# Patient Record
Sex: MALE | Race: WHITE | NOT HISPANIC OR LATINO
[De-identification: names, ages, dates, MRNs, and addresses within clinical notes are randomized per-mention and may not be internally consistent; named-entity substitution may affect disease eponyms.]

---

## 2017-01-30 ENCOUNTER — APPOINTMENT (OUTPATIENT)
Dept: ORTHOPEDIC SURGERY | Facility: CLINIC | Age: 62
End: 2017-01-30

## 2017-02-02 ENCOUNTER — APPOINTMENT (OUTPATIENT)
Dept: INTERNAL MEDICINE | Facility: CLINIC | Age: 62
End: 2017-02-02

## 2017-02-02 VITALS
HEIGHT: 73 IN | RESPIRATION RATE: 14 BRPM | SYSTOLIC BLOOD PRESSURE: 126 MMHG | DIASTOLIC BLOOD PRESSURE: 76 MMHG | WEIGHT: 165 LBS | BODY MASS INDEX: 21.87 KG/M2 | HEART RATE: 68 BPM

## 2017-02-08 ENCOUNTER — APPOINTMENT (OUTPATIENT)
Dept: INTERNAL MEDICINE | Facility: CLINIC | Age: 62
End: 2017-02-08

## 2017-02-08 VITALS
DIASTOLIC BLOOD PRESSURE: 70 MMHG | SYSTOLIC BLOOD PRESSURE: 124 MMHG | RESPIRATION RATE: 14 BRPM | HEIGHT: 73 IN | HEART RATE: 66 BPM | WEIGHT: 164 LBS | BODY MASS INDEX: 21.74 KG/M2

## 2017-02-08 RX ORDER — PREDNISONE 10 MG/1
10 TABLET ORAL
Qty: 10 | Refills: 0 | Status: DISCONTINUED | COMMUNITY
Start: 2016-12-09

## 2017-02-08 RX ORDER — AMOXICILLIN AND CLAVULANATE POTASSIUM 600; 42.9 MG/5ML; MG/5ML
600-42.9 FOR SUSPENSION ORAL
Qty: 125 | Refills: 0 | Status: DISCONTINUED | COMMUNITY
Start: 2017-01-05

## 2017-02-08 RX ORDER — AZITHROMYCIN 250 MG/1
250 TABLET, FILM COATED ORAL
Qty: 6 | Refills: 0 | Status: DISCONTINUED | COMMUNITY
Start: 2017-01-06

## 2017-02-09 ENCOUNTER — APPOINTMENT (OUTPATIENT)
Dept: ORTHOPEDIC SURGERY | Facility: CLINIC | Age: 62
End: 2017-02-09

## 2017-02-23 ENCOUNTER — APPOINTMENT (OUTPATIENT)
Dept: INTERNAL MEDICINE | Facility: CLINIC | Age: 62
End: 2017-02-23

## 2017-03-31 ENCOUNTER — APPOINTMENT (OUTPATIENT)
Dept: ORTHOPEDIC SURGERY | Facility: CLINIC | Age: 62
End: 2017-03-31

## 2017-03-31 VITALS — BODY MASS INDEX: 21.2 KG/M2 | HEIGHT: 73 IN | WEIGHT: 160 LBS

## 2017-04-14 ENCOUNTER — APPOINTMENT (OUTPATIENT)
Dept: INTERNAL MEDICINE | Facility: CLINIC | Age: 62
End: 2017-04-14

## 2017-04-14 VITALS
DIASTOLIC BLOOD PRESSURE: 74 MMHG | WEIGHT: 161 LBS | HEIGHT: 73 IN | BODY MASS INDEX: 21.34 KG/M2 | HEART RATE: 68 BPM | RESPIRATION RATE: 14 BRPM | SYSTOLIC BLOOD PRESSURE: 126 MMHG

## 2017-07-17 ENCOUNTER — APPOINTMENT (OUTPATIENT)
Dept: INTERNAL MEDICINE | Facility: CLINIC | Age: 62
End: 2017-07-17

## 2017-07-17 VITALS
HEART RATE: 70 BPM | WEIGHT: 159 LBS | HEIGHT: 73 IN | DIASTOLIC BLOOD PRESSURE: 72 MMHG | RESPIRATION RATE: 14 BRPM | SYSTOLIC BLOOD PRESSURE: 128 MMHG | BODY MASS INDEX: 21.07 KG/M2

## 2017-07-17 DIAGNOSIS — Z83.1 FAMILY HISTORY OF OTHER INFECTIOUS AND PARASITIC DISEASES: ICD-10-CM

## 2017-07-17 RX ORDER — HYDROCORTISONE ACETATE 25 MG/1
25 SUPPOSITORY RECTAL TWICE DAILY
Qty: 24 | Refills: 2 | Status: DISCONTINUED | COMMUNITY
Start: 2017-02-02 | End: 2017-07-17

## 2017-07-17 RX ORDER — HYDROCORTISONE 25 MG/G
2.5 CREAM TOPICAL
Qty: 1 | Refills: 2 | Status: DISCONTINUED | COMMUNITY
Start: 2017-02-02 | End: 2017-07-17

## 2017-09-13 ENCOUNTER — APPOINTMENT (OUTPATIENT)
Dept: ORTHOPEDIC SURGERY | Facility: CLINIC | Age: 62
End: 2017-09-13
Payer: COMMERCIAL

## 2017-09-13 DIAGNOSIS — M25.551 PAIN IN RIGHT HIP: ICD-10-CM

## 2017-09-13 PROCEDURE — 99213 OFFICE O/P EST LOW 20 MIN: CPT

## 2017-09-13 PROCEDURE — 73522 X-RAY EXAM HIPS BI 3-4 VIEWS: CPT

## 2017-09-21 ENCOUNTER — APPOINTMENT (OUTPATIENT)
Dept: ORTHOPEDIC SURGERY | Facility: CLINIC | Age: 62
End: 2017-09-21
Payer: COMMERCIAL

## 2017-09-21 PROCEDURE — 99213 OFFICE O/P EST LOW 20 MIN: CPT

## 2017-10-20 ENCOUNTER — APPOINTMENT (OUTPATIENT)
Dept: INTERNAL MEDICINE | Facility: CLINIC | Age: 62
End: 2017-10-20
Payer: COMMERCIAL

## 2017-10-20 VITALS
RESPIRATION RATE: 14 BRPM | BODY MASS INDEX: 20.94 KG/M2 | SYSTOLIC BLOOD PRESSURE: 124 MMHG | HEIGHT: 73 IN | TEMPERATURE: 98.2 F | HEART RATE: 68 BPM | WEIGHT: 158 LBS | DIASTOLIC BLOOD PRESSURE: 78 MMHG

## 2017-10-20 PROCEDURE — 99214 OFFICE O/P EST MOD 30 MIN: CPT

## 2017-12-07 ENCOUNTER — APPOINTMENT (OUTPATIENT)
Dept: INTERNAL MEDICINE | Facility: CLINIC | Age: 62
End: 2017-12-07
Payer: COMMERCIAL

## 2017-12-07 ENCOUNTER — NON-APPOINTMENT (OUTPATIENT)
Age: 62
End: 2017-12-07

## 2017-12-07 VITALS
HEART RATE: 70 BPM | SYSTOLIC BLOOD PRESSURE: 128 MMHG | BODY MASS INDEX: 22.13 KG/M2 | WEIGHT: 167 LBS | DIASTOLIC BLOOD PRESSURE: 80 MMHG | HEIGHT: 73 IN | RESPIRATION RATE: 14 BRPM

## 2017-12-07 DIAGNOSIS — K62.5 HEMORRHAGE OF ANUS AND RECTUM: ICD-10-CM

## 2017-12-07 PROCEDURE — 99396 PREV VISIT EST AGE 40-64: CPT

## 2017-12-07 PROCEDURE — 93000 ELECTROCARDIOGRAM COMPLETE: CPT

## 2017-12-07 PROCEDURE — 99214 OFFICE O/P EST MOD 30 MIN: CPT | Mod: 25

## 2017-12-07 RX ORDER — PANTOPRAZOLE 40 MG/1
40 TABLET, DELAYED RELEASE ORAL
Qty: 60 | Refills: 4 | Status: DISCONTINUED | COMMUNITY
Start: 2017-07-17 | End: 2017-12-07

## 2017-12-29 ENCOUNTER — APPOINTMENT (OUTPATIENT)
Dept: INTERNAL MEDICINE | Facility: CLINIC | Age: 62
End: 2017-12-29
Payer: COMMERCIAL

## 2017-12-29 VITALS
HEART RATE: 68 BPM | BODY MASS INDEX: 22.4 KG/M2 | RESPIRATION RATE: 15 BRPM | WEIGHT: 169 LBS | DIASTOLIC BLOOD PRESSURE: 74 MMHG | TEMPERATURE: 98.4 F | SYSTOLIC BLOOD PRESSURE: 124 MMHG | HEIGHT: 73 IN

## 2017-12-29 DIAGNOSIS — H72.91 UNSPECIFIED PERFORATION OF TYMPANIC MEMBRANE, RIGHT EAR: ICD-10-CM

## 2017-12-29 PROCEDURE — 99215 OFFICE O/P EST HI 40 MIN: CPT

## 2017-12-29 RX ORDER — AMOXICILLIN 250 MG/5ML
250 POWDER, FOR SUSPENSION ORAL
Qty: 200 | Refills: 0 | Status: DISCONTINUED | COMMUNITY
Start: 2017-08-24

## 2017-12-29 RX ORDER — IPRATROPIUM BROMIDE 42 UG/1
0.06 SPRAY NASAL
Qty: 15 | Refills: 0 | Status: DISCONTINUED | COMMUNITY
Start: 2017-08-30

## 2017-12-30 ENCOUNTER — LABORATORY RESULT (OUTPATIENT)
Age: 62
End: 2017-12-30

## 2018-01-03 ENCOUNTER — APPOINTMENT (OUTPATIENT)
Dept: INTERNAL MEDICINE | Facility: CLINIC | Age: 63
End: 2018-01-03
Payer: COMMERCIAL

## 2018-01-03 VITALS
SYSTOLIC BLOOD PRESSURE: 126 MMHG | HEIGHT: 73 IN | RESPIRATION RATE: 16 BRPM | WEIGHT: 168 LBS | BODY MASS INDEX: 22.26 KG/M2 | HEART RATE: 74 BPM | TEMPERATURE: 98.1 F | DIASTOLIC BLOOD PRESSURE: 80 MMHG

## 2018-01-03 DIAGNOSIS — K64.5 PERIANAL VENOUS THROMBOSIS: ICD-10-CM

## 2018-01-03 LAB
25(OH)D3 SERPL-MCNC: 27.5 NG/ML
ALBUMIN SERPL ELPH-MCNC: 4.4 G/DL
ALP BLD-CCNC: 51 U/L
ALT SERPL-CCNC: 19 U/L
ANION GAP SERPL CALC-SCNC: 11 MMOL/L
APPEARANCE: ABNORMAL
AST SERPL-CCNC: 24 U/L
BASOPHILS # BLD AUTO: 0.01 K/UL
BASOPHILS NFR BLD AUTO: 0.3 %
BILIRUB SERPL-MCNC: 0.5 MG/DL
BILIRUBIN URINE: NEGATIVE
BLOOD URINE: NEGATIVE
BUN SERPL-MCNC: 16 MG/DL
CALCIUM SERPL-MCNC: 9.6 MG/DL
CHLORIDE SERPL-SCNC: 100 MMOL/L
CHOLEST SERPL-MCNC: 213 MG/DL
CHOLEST/HDLC SERPL: 2.8 RATIO
CO2 SERPL-SCNC: 28 MMOL/L
COLOR: YELLOW
CREAT SERPL-MCNC: 0.87 MG/DL
CRP SERPL HS-MCNC: 0.2 MG/L
EOSINOPHIL # BLD AUTO: 0.25 K/UL
EOSINOPHIL NFR BLD AUTO: 6.3 %
GLUCOSE BS SERPL-MCNC: 85 MG/DL
GLUCOSE QUALITATIVE U: NEGATIVE MG/DL
GLUCOSE SERPL-MCNC: 90 MG/DL
HBA1C MFR BLD HPLC: 5.2 %
HCT VFR BLD CALC: 45 %
HCV AB SER QL: NONREACTIVE
HCV S/CO RATIO: 0.11 S/CO
HDLC SERPL-MCNC: 75 MG/DL
HGB BLD-MCNC: 14.7 G/DL
IMM GRANULOCYTES NFR BLD AUTO: 0.3 %
KETONES URINE: NEGATIVE
LDLC SERPL CALC-MCNC: 124 MG/DL
LEUKOCYTE ESTERASE URINE: NEGATIVE
LYMPHOCYTES # BLD AUTO: 1.12 K/UL
LYMPHOCYTES NFR BLD AUTO: 28.4 %
MAN DIFF?: NORMAL
MCHC RBC-ENTMCNC: 31.5 PG
MCHC RBC-ENTMCNC: 32.7 GM/DL
MCV RBC AUTO: 96.6 FL
MONOCYTES # BLD AUTO: 0.46 K/UL
MONOCYTES NFR BLD AUTO: 11.7 %
NEUTROPHILS # BLD AUTO: 2.09 K/UL
NEUTROPHILS NFR BLD AUTO: 53 %
NITRITE URINE: NEGATIVE
PH URINE: 7.5
PLATELET # BLD AUTO: 245 K/UL
POTASSIUM SERPL-SCNC: 4.7 MMOL/L
PROT SERPL-MCNC: 6.9 G/DL
PROTEIN URINE: NEGATIVE MG/DL
PSA FREE FLD-MCNC: 48
PSA FREE SERPL-MCNC: 0.47 NG/ML
PSA SERPL-MCNC: 0.98 NG/ML
RBC # BLD: 4.66 M/UL
RBC # FLD: 13.2 %
SODIUM SERPL-SCNC: 139 MMOL/L
SPECIFIC GRAVITY URINE: 1.02
T4 FREE SERPL-MCNC: 1 NG/DL
TESTOST BND SERPL-MCNC: 9.9 PG/ML
TESTOST SERPL-MCNC: 492.7 NG/DL
TRIGL SERPL-MCNC: 71 MG/DL
TSH SERPL-ACNC: 2.28 UIU/ML
UROBILINOGEN URINE: NEGATIVE MG/DL
VIT B12 SERPL-MCNC: 439 PG/ML
WBC # FLD AUTO: 3.94 K/UL

## 2018-01-03 PROCEDURE — 99215 OFFICE O/P EST HI 40 MIN: CPT

## 2018-02-14 ENCOUNTER — APPOINTMENT (OUTPATIENT)
Dept: INTERNAL MEDICINE | Facility: CLINIC | Age: 63
End: 2018-02-14
Payer: COMMERCIAL

## 2018-02-14 VITALS
HEIGHT: 73 IN | WEIGHT: 170 LBS | HEART RATE: 76 BPM | BODY MASS INDEX: 22.53 KG/M2 | DIASTOLIC BLOOD PRESSURE: 80 MMHG | RESPIRATION RATE: 14 BRPM | SYSTOLIC BLOOD PRESSURE: 128 MMHG

## 2018-02-14 DIAGNOSIS — R82.90 UNSPECIFIED ABNORMAL FINDINGS IN URINE: ICD-10-CM

## 2018-02-14 DIAGNOSIS — R63.5 ABNORMAL WEIGHT GAIN: ICD-10-CM

## 2018-02-14 DIAGNOSIS — K64.5 PERIANAL VENOUS THROMBOSIS: ICD-10-CM

## 2018-02-14 PROCEDURE — 99215 OFFICE O/P EST HI 40 MIN: CPT

## 2018-02-15 LAB
APPEARANCE: CLEAR
BILIRUBIN URINE: NEGATIVE
BLOOD URINE: NEGATIVE
COLOR: YELLOW
GLUCOSE QUALITATIVE U: NEGATIVE MG/DL
KETONES URINE: NEGATIVE
LEUKOCYTE ESTERASE URINE: NEGATIVE
NITRITE URINE: NEGATIVE
PH URINE: 6
PROTEIN URINE: NEGATIVE MG/DL
SPECIFIC GRAVITY URINE: 1.01
UROBILINOGEN URINE: NEGATIVE MG/DL

## 2018-05-15 ENCOUNTER — APPOINTMENT (OUTPATIENT)
Dept: ORTHOPEDIC SURGERY | Facility: CLINIC | Age: 63
End: 2018-05-15
Payer: COMMERCIAL

## 2018-05-15 PROCEDURE — 73564 X-RAY EXAM KNEE 4 OR MORE: CPT | Mod: RT

## 2018-05-15 PROCEDURE — 99213 OFFICE O/P EST LOW 20 MIN: CPT

## 2018-07-05 ENCOUNTER — APPOINTMENT (OUTPATIENT)
Dept: INTERNAL MEDICINE | Facility: CLINIC | Age: 63
End: 2018-07-05

## 2018-07-17 ENCOUNTER — APPOINTMENT (OUTPATIENT)
Dept: INTERNAL MEDICINE | Facility: CLINIC | Age: 63
End: 2018-07-17
Payer: COMMERCIAL

## 2018-07-17 VITALS
BODY MASS INDEX: 22.66 KG/M2 | SYSTOLIC BLOOD PRESSURE: 124 MMHG | RESPIRATION RATE: 15 BRPM | HEIGHT: 73 IN | HEART RATE: 80 BPM | WEIGHT: 171 LBS | DIASTOLIC BLOOD PRESSURE: 80 MMHG

## 2018-07-17 PROCEDURE — 99215 OFFICE O/P EST HI 40 MIN: CPT

## 2018-07-17 RX ORDER — PANTOPRAZOLE 40 MG/1
40 TABLET, DELAYED RELEASE ORAL
Qty: 30 | Refills: 5 | Status: DISCONTINUED | COMMUNITY
Start: 2018-03-01 | End: 2018-07-17

## 2018-07-17 NOTE — PHYSICAL EXAM
[No Acute Distress] : no acute distress [Well Nourished] : well nourished [Well Developed] : well developed [Well-Appearing] : well-appearing [Normal Voice/Communication] : normal voice/communication [Normal Sclera/Conjunctiva] : normal sclera/conjunctiva [PERRL] : pupils equal round and reactive to light [EOMI] : extraocular movements intact [Normal Outer Ear/Nose] : the outer ears and nose were normal in appearance [Normal Oropharynx] : the oropharynx was normal [Normal TMs] : both tympanic membranes were normal [Normal Nasal Mucosa] : the nasal mucosa was normal [No JVD] : no jugular venous distention [Supple] : supple [No Lymphadenopathy] : no lymphadenopathy [Thyroid Normal, No Nodules] : the thyroid was normal and there were no nodules present [No Respiratory Distress] : no respiratory distress  [Clear to Auscultation] : lungs were clear to auscultation bilaterally [No Accessory Muscle Use] : no accessory muscle use [Normal Percussion] : the chest was normal to percussion [Normal Rate] : normal rate  [Regular Rhythm] : with a regular rhythm [Normal S1, S2] : normal S1 and S2 [No Murmur] : no murmur heard [No Carotid Bruits] : no carotid bruits [No Abdominal Bruit] : a ~M bruit was not heard ~T in the abdomen [No Varicosities] : no varicosities [Pedal Pulses Present] : the pedal pulses are present [No Edema] : there was no peripheral edema [No Extremity Clubbing/Cyanosis] : no extremity clubbing/cyanosis [No Palpable Aorta] : no palpable aorta [Declined Breast Exam] : declined breast exam  [Soft] : abdomen soft [Non Tender] : non-tender [Non-distended] : non-distended [No Masses] : no abdominal mass palpated [No HSM] : no HSM [Normal Bowel Sounds] : normal bowel sounds [No Hernias] : no hernias [Declined Rectal Exam] : declined rectal exam [Normal Supraclavicular Nodes] : no supraclavicular lymphadenopathy [Normal Axillary Nodes] : no axillary lymphadenopathy [Normal Posterior Cervical Nodes] : no posterior cervical lymphadenopathy [Normal Femoral Nodes] : no femoral lymphadenopathy [Normal Anterior Cervical Nodes] : no anterior cervical lymphadenopathy [Normal Inguinal Nodes] : no inguinal lymphadenopathy [No CVA Tenderness] : no CVA  tenderness [No Spinal Tenderness] : no spinal tenderness [No Joint Swelling] : no joint swelling [Grossly Normal Strength/Tone] : grossly normal strength/tone [No Rash] : no rash [Normal Gait] : normal gait [Coordination Grossly Intact] : coordination grossly intact [No Focal Deficits] : no focal deficits [Deep Tendon Reflexes (DTR)] : deep tendon reflexes were 2+ and symmetric [Speech Grossly Normal] : speech grossly normal [Memory Grossly Normal] : memory grossly normal [Normal Affect] : the affect was normal [Alert and Oriented x3] : oriented to person, place, and time [Normal Mood] : the mood was normal [Normal Insight/Judgement] : insight and judgment were intact

## 2018-07-17 NOTE — ASSESSMENT
[FreeTextEntry1] : Her examination shows a well-developed man in no acute distress his weight was 171 pounds he is 6 foot 1 inch heart rate of 80 respiratory rate of 15 blood pressure 124/80 HEENT was unremarkable chest was clear cardiovascular was regular abdomen was soft neuro was nonfocal examination . Patient's situation with his dysphagia was reviewed he has dysphagia to solid foods only occasionally has no problem with left liquid foods making motility less likely he has had ENT exams showing oropharyngeal reflux a barium swallow suggested slight narrowing at the GE junction was was not confirmed by endoscopy there was no resistance to passage of the scope through the GE junction patient is concerned about associated conditions in the fasciculation and will seek neurologic consultation evaluate for neurologic diseases if his dysphagia worsens at any point he will be referred to a motility Center at [facility] for further evaluation

## 2018-07-17 NOTE — REVIEW OF SYSTEMS
[Nocturia] : nocturia [Impotence] : impotence [Fever] : no fever [Chills] : no chills [Fatigue] : no fatigue [Hot Flashes] : no hot flashes [Night Sweats] : no night sweats [Recent Change In Weight] : ~T no recent weight change [Discharge] : no discharge [Pain] : no pain [Redness] : no redness [Dryness] : no dryness [Vision Problems] : no vision problems [Itching] : no itching [Earache] : no earache [Hearing Loss] : no hearing loss [Nosebleeds] : no nosebleeds [Postnasal Drip] : no postnasal drip [Nasal Discharge] : no nasal discharge [Sore Throat] : no sore throat [Hoarseness] : no hoarseness [Chest Pain] : no chest pain [Palpitations] : no palpitations [Claudication] : no  leg claudication [Lower Ext Edema] : no lower extremity edema [Orthopena] : no orthopnea [Paroysmal Nocturnal Dyspnea] : no paroysmal nocturnal dyspnea [Shortness Of Breath] : no shortness of breath [Wheezing] : no wheezing [Cough] : no cough [Dyspnea on Exertion] : not dyspnea on exertion [Abdominal Pain] : no abdominal pain [Nausea] : no nausea [Constipation] : no constipation [Diarrhea] : no diarrhea [Vomiting] : no vomiting [Heartburn] : no heartburn [Melena] : no melena [Dysuria] : no dysuria [Incontinence] : no incontinence [Hesitancy] : no hesitancy [Hematuria] : no hematuria [Frequency] : no frequency [Poor Libido] : libido not poor [Joint Pain] : no joint pain [Joint Stiffness] : no joint stiffness [Muscle Pain] : no muscle pain [Muscle Weakness] : no muscle weakness [Back Pain] : no back pain [Joint Swelling] : no joint swelling [Mole Changes] : no mole changes [Nail Changes] : no nail changes [Hair Changes] : no hair changes [Skin Rash] : no skin rash [Headache] : no headache [Dizziness] : no dizziness [Fainting] : no fainting [Confusion] : no confusion [Unsteady Walk] : no ataxia [Memory Loss] : no memory loss [Suicidal] : not suicidal [Insomnia] : no insomnia [Anxiety] : no anxiety [Depression] : no depression [Easy Bleeding] : no easy bleeding [Easy Bruising] : no easy bruising [Swollen Glands] : no swollen glands [FreeTextEntry7] : dysphagia

## 2018-07-22 PROBLEM — M25.551 PAIN IN JOINT INVOLVING PELVIC REGION AND THIGH, RIGHT: Status: ACTIVE | Noted: 2017-09-13

## 2018-08-02 ENCOUNTER — APPOINTMENT (OUTPATIENT)
Dept: INTERNAL MEDICINE | Facility: CLINIC | Age: 63
End: 2018-08-02
Payer: COMMERCIAL

## 2018-08-02 VITALS
RESPIRATION RATE: 15 BRPM | SYSTOLIC BLOOD PRESSURE: 122 MMHG | WEIGHT: 170 LBS | DIASTOLIC BLOOD PRESSURE: 74 MMHG | HEIGHT: 73 IN | HEART RATE: 78 BPM | BODY MASS INDEX: 22.53 KG/M2

## 2018-08-02 PROCEDURE — 99214 OFFICE O/P EST MOD 30 MIN: CPT

## 2018-08-02 NOTE — HEALTH RISK ASSESSMENT
[No falls in past year] : Patient reported no falls in the past year [0] : 2) Feeling down, depressed, or hopeless: Not at all (0) [[id number]] : 0

## 2018-08-02 NOTE — ASSESSMENT
[FreeTextEntry1] : His exam shows a well-developed male in no acute distress vital signs were stable HEENT was unremarkable there was no masses felt there was some muscle tightness on the right side of his neck patient has limited flexion and extension and lateral rotation secondary to previous problems with his lower cervical spine chest is clear cardiovascular was regular abdomen soft neuro nonfocal impression cervical neck pain we'll treat with heat anti-inflammatories x-ray will be obtained if no improvement by Monday he has a scheduled appointment to see a neurologist next week where the situation can be reevaluated

## 2018-08-02 NOTE — REVIEW OF SYSTEMS
[Back Pain] : back pain [Fever] : no fever [Chills] : no chills [Fatigue] : no fatigue [Hot Flashes] : no hot flashes [Night Sweats] : no night sweats [Recent Change In Weight] : ~T no recent weight change [Discharge] : no discharge [Pain] : no pain [Redness] : no redness [Dryness] : no dryness [Vision Problems] : no vision problems [Itching] : no itching [Earache] : no earache [Hearing Loss] : no hearing loss [Nosebleeds] : no nosebleeds [Postnasal Drip] : no postnasal drip [Nasal Discharge] : no nasal discharge [Sore Throat] : no sore throat [Hoarseness] : no hoarseness [Chest Pain] : no chest pain [Palpitations] : no palpitations [Claudication] : no  leg claudication [Lower Ext Edema] : no lower extremity edema [Orthopena] : no orthopnea [Paroysmal Nocturnal Dyspnea] : no paroysmal nocturnal dyspnea [Shortness Of Breath] : no shortness of breath [Wheezing] : no wheezing [Cough] : no cough [Dyspnea on Exertion] : not dyspnea on exertion [Abdominal Pain] : no abdominal pain [Nausea] : no nausea [Constipation] : no constipation [Diarrhea] : no diarrhea [Vomiting] : no vomiting [Heartburn] : no heartburn [Melena] : no melena [Dysuria] : no dysuria [Incontinence] : no incontinence [Hesitancy] : no hesitancy [Nocturia] : no nocturia [Hematuria] : no hematuria [Frequency] : no frequency [Impotence] : no impotency [Poor Libido] : libido not poor [Joint Pain] : no joint pain [Joint Stiffness] : no joint stiffness [Muscle Pain] : no muscle pain [Muscle Weakness] : no muscle weakness [Joint Swelling] : no joint swelling [Mole Changes] : no mole changes [Nail Changes] : no nail changes [Hair Changes] : no hair changes [Skin Rash] : no skin rash [Headache] : no headache [Dizziness] : no dizziness [Fainting] : no fainting [Confusion] : no confusion [Unsteady Walk] : no ataxia [Memory Loss] : no memory loss [Suicidal] : not suicidal [Insomnia] : no insomnia [Anxiety] : no anxiety [Depression] : no depression [Easy Bleeding] : no easy bleeding [Easy Bruising] : no easy bruising [Swollen Glands] : no swollen glands [FreeTextEntry9] : right side of neck

## 2018-08-02 NOTE — HISTORY OF PRESENT ILLNESS
[___ Weeks ago] : [unfilled] weeks ago [Mild] : mild [Rest] : rest [Activity] : with activity [Stable] : stable [de-identified] : right neck [FreeTextEntry8] : 63-year-old man with a history of chronic headaches dysphasia GERD constipation comes to the office complaining of a discomfort in the upper right side of his neck over the past several weeks pain is exacerbated slightly by motion describes the pain as 2/10 at times he says the pain is not there he has had previous lower cervical problems and comes in for advice he denies temperature chills sweats or myalgias he has had no chest pain shortness of breath occasional dyspnea palpitations or syncope he does complain of occasional vertigo denies abdominal pain nausea vomiting diarrhea constipation rectal bleeding or melena

## 2018-08-06 ENCOUNTER — APPOINTMENT (OUTPATIENT)
Dept: NEUROLOGY | Facility: CLINIC | Age: 63
End: 2018-08-06
Payer: COMMERCIAL

## 2018-08-06 VITALS
HEART RATE: 83 BPM | SYSTOLIC BLOOD PRESSURE: 118 MMHG | DIASTOLIC BLOOD PRESSURE: 76 MMHG | TEMPERATURE: 98.7 F | RESPIRATION RATE: 18 BRPM | OXYGEN SATURATION: 98 % | BODY MASS INDEX: 23.46 KG/M2 | WEIGHT: 177 LBS | HEIGHT: 73 IN

## 2018-08-06 PROCEDURE — 99204 OFFICE O/P NEW MOD 45 MIN: CPT

## 2018-08-06 RX ORDER — HYDROCORTISONE 25 MG/G
2.5 CREAM TOPICAL
Qty: 1 | Refills: 2 | Status: DISCONTINUED | COMMUNITY
Start: 2017-12-07 | End: 2018-08-06

## 2018-08-15 ENCOUNTER — APPOINTMENT (OUTPATIENT)
Dept: ORTHOPEDIC SURGERY | Facility: CLINIC | Age: 63
End: 2018-08-15
Payer: COMMERCIAL

## 2018-08-15 VITALS
HEIGHT: 73 IN | HEART RATE: 67 BPM | BODY MASS INDEX: 23.46 KG/M2 | DIASTOLIC BLOOD PRESSURE: 66 MMHG | WEIGHT: 177 LBS | SYSTOLIC BLOOD PRESSURE: 109 MMHG

## 2018-08-15 DIAGNOSIS — M54.5 LOW BACK PAIN: ICD-10-CM

## 2018-08-15 PROCEDURE — 99214 OFFICE O/P EST MOD 30 MIN: CPT

## 2018-08-22 ENCOUNTER — APPOINTMENT (OUTPATIENT)
Dept: INTERNAL MEDICINE | Facility: CLINIC | Age: 63
End: 2018-08-22
Payer: COMMERCIAL

## 2018-08-22 VITALS
WEIGHT: 178 LBS | SYSTOLIC BLOOD PRESSURE: 114 MMHG | DIASTOLIC BLOOD PRESSURE: 74 MMHG | BODY MASS INDEX: 23.59 KG/M2 | HEIGHT: 73 IN | RESPIRATION RATE: 15 BRPM | HEART RATE: 72 BPM

## 2018-08-22 DIAGNOSIS — R25.3 FASCICULATION: ICD-10-CM

## 2018-08-22 DIAGNOSIS — M54.2 CERVICALGIA: ICD-10-CM

## 2018-08-22 PROCEDURE — 99214 OFFICE O/P EST MOD 30 MIN: CPT

## 2018-08-22 NOTE — ASSESSMENT
[FreeTextEntry1] : Physical exam shows a well-developed man in no acute distress his blood pressure was 114/74 his height was 6 feet 1 inch weight 178 pounds heart rate is 72 respiratory rate of 14 HEENT was unremarkable chest was clear cardiovascular was regular abdomen soft and nontender 70 showed no clubbing cyanosis or edema neurologic exam was nonfocal;..... patient will undergo EMG and nerve conduction on Tuesday although the chances of finding serious neuromuscular conditions seem low patient was reassured and will get the study comvinced

## 2018-08-22 NOTE — PHYSICAL EXAM
[No Acute Distress] : no acute distress [Well Nourished] : well nourished [Well Developed] : well developed [Well-Appearing] : well-appearing [Normal Voice/Communication] : normal voice/communication [Normal Sclera/Conjunctiva] : normal sclera/conjunctiva [PERRL] : pupils equal round and reactive to light [EOMI] : extraocular movements intact [Normal Outer Ear/Nose] : the outer ears and nose were normal in appearance [Normal Oropharynx] : the oropharynx was normal [Normal TMs] : both tympanic membranes were normal [Normal Nasal Mucosa] : the nasal mucosa was normal [No JVD] : no jugular venous distention [Supple] : supple [No Lymphadenopathy] : no lymphadenopathy [Thyroid Normal, No Nodules] : the thyroid was normal and there were no nodules present [No Respiratory Distress] : no respiratory distress  [Clear to Auscultation] : lungs were clear to auscultation bilaterally [No Accessory Muscle Use] : no accessory muscle use [Normal Rate] : normal rate  [Regular Rhythm] : with a regular rhythm [Normal S1, S2] : normal S1 and S2 [No Murmur] : no murmur heard [No Carotid Bruits] : no carotid bruits [No Abdominal Bruit] : a ~M bruit was not heard ~T in the abdomen [No Varicosities] : no varicosities [Pedal Pulses Present] : the pedal pulses are present [No Edema] : there was no peripheral edema [No Extremity Clubbing/Cyanosis] : no extremity clubbing/cyanosis [No Palpable Aorta] : no palpable aorta [Declined Breast Exam] : declined breast exam  [Soft] : abdomen soft [Non Tender] : non-tender [Non-distended] : non-distended [No Masses] : no abdominal mass palpated [No HSM] : no HSM [Normal Bowel Sounds] : normal bowel sounds [No Hernias] : no hernias [Declined Rectal Exam] : declined rectal exam [Normal Supraclavicular Nodes] : no supraclavicular lymphadenopathy [Normal Axillary Nodes] : no axillary lymphadenopathy [Normal Posterior Cervical Nodes] : no posterior cervical lymphadenopathy [Normal Femoral Nodes] : no femoral lymphadenopathy [Normal Anterior Cervical Nodes] : no anterior cervical lymphadenopathy [Normal Inguinal Nodes] : no inguinal lymphadenopathy [No CVA Tenderness] : no CVA  tenderness [No Spinal Tenderness] : no spinal tenderness [Kyphosis] : no kyphosis [Scoliosis] : no scoliosis [No Joint Swelling] : no joint swelling [Grossly Normal Strength/Tone] : grossly normal strength/tone [No Rash] : no rash [No Skin Lesions] : no skin lesions [Acne] : no acne [Normal Gait] : normal gait [Coordination Grossly Intact] : coordination grossly intact [No Focal Deficits] : no focal deficits [Deep Tendon Reflexes (DTR)] : deep tendon reflexes were 2+ and symmetric [Speech Grossly Normal] : speech grossly normal [Normal Affect] : the affect was normal [Alert and Oriented x3] : oriented to person, place, and time [Normal Mood] : the mood was normal [Normal Insight/Judgement] : insight and judgment were intact

## 2018-08-22 NOTE — HISTORY OF PRESENT ILLNESS
[FreeTextEntry1] : Comes to the office for followup review his medications discuss his overall health recent issues with lightheadedness right-sided neck pain muscle twitching making him concern for a serious neurologic problem [de-identified] : 63-year-old man with a history of cervicogenic headaches this sensation occurred neck pain muscle twitching lightheadedness comes to the office for followup patient has seen 2 neurologists who both have reassured him that they did not believe he any serious neuromuscular conditions since there consultation he claims before bedtime his muscles twitch he denies headache nausea vomiting chest pain shortness of breath exertional dyspnea lightheadedness vertigo or syncope denies abdominal pain swallowing has been doing well with minimal dysphasia appetite has been good his weight is stable his bowels been moving well he does get up once or twice a night to urinate

## 2018-08-22 NOTE — HEALTH RISK ASSESSMENT
[No falls in past year] : Patient reported no falls in the past year [0] : 2) Feeling down, depressed, or hopeless: Not at all (0) [[id number]] : 0

## 2018-08-22 NOTE — REVIEW OF SYSTEMS
[Back Pain] : back pain [Dizziness] : dizziness [Fever] : no fever [Chills] : no chills [Fatigue] : no fatigue [Hot Flashes] : no hot flashes [Night Sweats] : no night sweats [Recent Change In Weight] : ~T no recent weight change [Discharge] : no discharge [Pain] : no pain [Redness] : no redness [Dryness] : no dryness [Vision Problems] : no vision problems [Itching] : no itching [Earache] : no earache [Hearing Loss] : no hearing loss [Nosebleeds] : no nosebleeds [Postnasal Drip] : no postnasal drip [Nasal Discharge] : no nasal discharge [Sore Throat] : no sore throat [Hoarseness] : no hoarseness [Chest Pain] : no chest pain [Palpitations] : no palpitations [Claudication] : no  leg claudication [Lower Ext Edema] : no lower extremity edema [Orthopena] : no orthopnea [Paroysmal Nocturnal Dyspnea] : no paroysmal nocturnal dyspnea [Shortness Of Breath] : no shortness of breath [Wheezing] : no wheezing [Cough] : no cough [Dyspnea on Exertion] : not dyspnea on exertion [Abdominal Pain] : no abdominal pain [Nausea] : no nausea [Constipation] : no constipation [Diarrhea] : no diarrhea [Vomiting] : no vomiting [Heartburn] : no heartburn [Melena] : no melena [Dysuria] : no dysuria [Incontinence] : no incontinence [Hesitancy] : no hesitancy [Nocturia] : no nocturia [Hematuria] : no hematuria [Frequency] : no frequency [Impotence] : no impotency [Poor Libido] : libido not poor [Joint Pain] : no joint pain [Joint Stiffness] : no joint stiffness [Muscle Pain] : no muscle pain [Muscle Weakness] : no muscle weakness [Joint Swelling] : no joint swelling [Itching] : no itching [Mole Changes] : no mole changes [Nail Changes] : no nail changes [Hair Changes] : no hair changes [Skin Rash] : no skin rash [Headache] : no headache [Fainting] : no fainting [Confusion] : no confusion [Unsteady Walk] : no ataxia [Memory Loss] : no memory loss [Suicidal] : not suicidal [Insomnia] : no insomnia [Anxiety] : no anxiety [Depression] : no depression [Easy Bleeding] : no easy bleeding [Easy Bruising] : no easy bruising [Swollen Glands] : no swollen glands [FreeTextEntry9] : twitches and right sided neck pain

## 2018-08-31 ENCOUNTER — LABORATORY RESULT (OUTPATIENT)
Age: 63
End: 2018-08-31

## 2018-09-06 LAB
B BURGDOR AB SER-IMP: NEGATIVE
B BURGDOR IGG+IGM SER QL IB: NORMAL
B BURGDOR IGM PATRN SER IB-IMP: NEGATIVE
B BURGDOR18/20KD IGM SER QL IB: NORMAL
B BURGDOR18KD IGG SER QL IB: NORMAL
B BURGDOR23KD IGG SER QL IB: NORMAL
B BURGDOR23KD IGM SER QL IB: PRESENT
B BURGDOR28KD AB SER QL IB: NORMAL
B BURGDOR28KD IGG SER QL IB: NORMAL
B BURGDOR30KD AB SER QL IB: NORMAL
B BURGDOR30KD IGG SER QL IB: NORMAL
B BURGDOR31KD IGG SER QL IB: NORMAL
B BURGDOR31KD IGM SER QL IB: NORMAL
B BURGDOR39KD IGG SER QL IB: NORMAL
B BURGDOR39KD IGM SER QL IB: NORMAL
B BURGDOR41KD IGG SER QL IB: NORMAL
B BURGDOR41KD IGM SER QL IB: NORMAL
B BURGDOR45KD AB SER QL IB: NORMAL
B BURGDOR45KD IGG SER QL IB: NORMAL
B BURGDOR58KD AB SER QL IB: NORMAL
B BURGDOR58KD IGG SER QL IB: PRESENT
B BURGDOR66KD IGG SER QL IB: NORMAL
B BURGDOR66KD IGM SER QL IB: NORMAL
B BURGDOR93KD IGG SER QL IB: NORMAL
B BURGDOR93KD IGM SER QL IB: NORMAL

## 2018-09-08 ENCOUNTER — TRANSCRIPTION ENCOUNTER (OUTPATIENT)
Age: 63
End: 2018-09-08

## 2018-09-24 ENCOUNTER — APPOINTMENT (OUTPATIENT)
Dept: INTERNAL MEDICINE | Facility: CLINIC | Age: 63
End: 2018-09-24
Payer: COMMERCIAL

## 2018-09-24 VITALS
DIASTOLIC BLOOD PRESSURE: 70 MMHG | BODY MASS INDEX: 23.46 KG/M2 | RESPIRATION RATE: 16 BRPM | HEIGHT: 73 IN | HEART RATE: 78 BPM | SYSTOLIC BLOOD PRESSURE: 120 MMHG | WEIGHT: 177 LBS

## 2018-09-24 PROCEDURE — 99215 OFFICE O/P EST HI 40 MIN: CPT

## 2018-09-24 NOTE — HISTORY OF PRESENT ILLNESS
[FreeTextEntry1] : Comes to the office for followup to review his medications discuss his overall health of note recently has been complaining of left foot sensitivity [de-identified] : 63-year-old male with a history of chronic headaches erectile dysfunction lower back pains hemorrhoids and so the office for followup main complaint being a hypersensitivity of the skin of the left foot patient recently has seen a neurologist concerning some cramps of his muscles and body twitches he underwent EMGs and nerve conductions at that time as well as complete blood test and was told that no neuromuscular diseases could be identified he denies temperature chills sweats or myalgias he's had no headache sinus congestion sore throat cough wheezing shortness of breath exertional dyspnea chest pain lightheadedness palpitations dizziness vertigo or syncope denies abdominal pain nausea vomiting diarrhea mild constipation no bright red blood per rectum and no black stools he occasionally has some lower back pain otherwise denies any significant musculoskeletal complaints his appetite has been good and his weight's been stable

## 2018-09-24 NOTE — ASSESSMENT
[FreeTextEntry1] : Physical exam shows a well-developed man in no acute distress blood pressure 120/70 pulse is 78 respiratory rate of 15 HEENT was unremarkable chest was clear cardiovascular exam was regular abdomen soft extremities show no clubbing cyanosis or edema neurologic exam was nonfocal uncertain to the sensitivity to his left foot her back versus tarsal tunnel syndrome Will discuss with Dr. Lazcano his neurologist as to what he think the next step would be.He is up-to-date with his ophthalmologist dermatologist and with colonoscopies old blood tests will be due in December of 2018

## 2018-09-24 NOTE — REVIEW OF SYSTEMS
[Back Pain] : back pain [Negative] : Heme/Lymph [Fever] : no fever [Chills] : no chills [Fatigue] : no fatigue [Hot Flashes] : no hot flashes [Night Sweats] : no night sweats [Recent Change In Weight] : ~T no recent weight change [Discharge] : no discharge [Pain] : no pain [Redness] : no redness [Dryness] : no dryness [Vision Problems] : no vision problems [Itching] : no itching [Earache] : no earache [Hearing Loss] : no hearing loss [Nosebleeds] : no nosebleeds [Postnasal Drip] : no postnasal drip [Nasal Discharge] : no nasal discharge [Sore Throat] : no sore throat [Hoarseness] : no hoarseness [Chest Pain] : no chest pain [Palpitations] : no palpitations [Claudication] : no  leg claudication [Lower Ext Edema] : no lower extremity edema [Orthopena] : no orthopnea [Paroysmal Nocturnal Dyspnea] : no paroysmal nocturnal dyspnea [Shortness Of Breath] : no shortness of breath [Wheezing] : no wheezing [Cough] : no cough [Dyspnea on Exertion] : not dyspnea on exertion [Abdominal Pain] : no abdominal pain [Nausea] : no nausea [Constipation] : no constipation [Diarrhea] : no diarrhea [Vomiting] : no vomiting [Heartburn] : no heartburn [Melena] : no melena [Dysuria] : no dysuria [Incontinence] : no incontinence [Hesitancy] : no hesitancy [Nocturia] : no nocturia [Hematuria] : no hematuria [Frequency] : no frequency [Impotence] : no impotency [Poor Libido] : libido not poor [Joint Pain] : no joint pain [Joint Stiffness] : no joint stiffness [Muscle Pain] : no muscle pain [Muscle Weakness] : no muscle weakness [Joint Swelling] : no joint swelling [Itching] : no itching [Mole Changes] : no mole changes [Nail Changes] : no nail changes [Hair Changes] : no hair changes [Skin Rash] : no skin rash [Headache] : no headache [Dizziness] : no dizziness [Fainting] : no fainting [Confusion] : no confusion [Unsteady Walk] : no ataxia [Memory Loss] : no memory loss [Suicidal] : not suicidal [Insomnia] : no insomnia [Anxiety] : no anxiety [Depression] : no depression [Easy Bleeding] : no easy bleeding [Easy Bruising] : no easy bruising [Swollen Glands] : no swollen glands [FreeTextEntry9] : hypersensitivity left foot

## 2018-09-24 NOTE — HEALTH RISK ASSESSMENT
[No falls in past year] : Patient reported no falls in the past year [0] : 2) Feeling down, depressed, or hopeless: Not at all (0) [[id number]] : 0

## 2018-10-24 ENCOUNTER — RX RENEWAL (OUTPATIENT)
Age: 63
End: 2018-10-24

## 2018-11-05 ENCOUNTER — APPOINTMENT (OUTPATIENT)
Dept: ORTHOPEDIC SURGERY | Facility: CLINIC | Age: 63
End: 2018-11-05
Payer: COMMERCIAL

## 2018-11-05 PROCEDURE — 73562 X-RAY EXAM OF KNEE 3: CPT | Mod: LT

## 2018-11-05 PROCEDURE — 99213 OFFICE O/P EST LOW 20 MIN: CPT

## 2018-12-06 ENCOUNTER — APPOINTMENT (OUTPATIENT)
Dept: ORTHOPEDIC SURGERY | Facility: CLINIC | Age: 63
End: 2018-12-06

## 2018-12-13 ENCOUNTER — NON-APPOINTMENT (OUTPATIENT)
Age: 63
End: 2018-12-13

## 2018-12-13 ENCOUNTER — APPOINTMENT (OUTPATIENT)
Dept: INTERNAL MEDICINE | Facility: CLINIC | Age: 63
End: 2018-12-13
Payer: COMMERCIAL

## 2018-12-13 VITALS
DIASTOLIC BLOOD PRESSURE: 74 MMHG | WEIGHT: 170 LBS | RESPIRATION RATE: 15 BRPM | SYSTOLIC BLOOD PRESSURE: 128 MMHG | HEIGHT: 73 IN | HEART RATE: 78 BPM | BODY MASS INDEX: 22.53 KG/M2

## 2018-12-13 DIAGNOSIS — S83.209A UNSPECIFIED TEAR OF UNSPECIFIED MENISCUS, CURRENT INJURY, UNSPECIFIED KNEE, INITIAL ENCOUNTER: ICD-10-CM

## 2018-12-13 PROCEDURE — 93000 ELECTROCARDIOGRAM COMPLETE: CPT

## 2018-12-13 PROCEDURE — 99245 OFF/OP CONSLTJ NEW/EST HI 55: CPT | Mod: 25

## 2018-12-13 NOTE — HISTORY OF PRESENT ILLNESS
[No Pertinent Cardiac History] : no history of aortic stenosis, atrial fibrillation, coronary artery disease, recent myocardial infarction, or implantable device/pacemaker [No Pertinent Pulmonary History] : no history of asthma, COPD, sleep apnea, or smoking [No Adverse Anesthesia Reaction] : no adverse anesthesia reaction in self or family member [(Patient denies any chest pain, claudication, dyspnea on exertion, orthopnea, palpitations or syncope)] : Patient denies any chest pain, claudication, dyspnea on exertion, orthopnea, palpitations or syncope [Aortic Stenosis] : no aortic stenosis [Atrial Fibrillation] : no atrial fibrillation [Coronary Artery Disease] : no coronary artery disease [Recent Myocardial Infarction] : no recent myocardial infarction [Implantable Device/Pacemaker] : no implantable device/pacemaker [Asthma] : no asthma [COPD] : no COPD [Sleep Apnea] : no sleep apnea [Smoker] : not a smoker [Family Member] : no family member with adverse anesthesia reaction/sudden death [Self] : no previous adverse anesthesia reaction [Chronic Anticoagulation] : no chronic anticoagulation [Chronic Kidney Disease] : no chronic kidney disease [Diabetes] : no diabetes [FreeTextEntry1] : left knee [FreeTextEntry2] : 12/26/18 [FreeTextEntry3] : Dr Genaro Sultana [FreeTextEntry4] : 63-year-old man with a history of GERD constipation comes to the office for medical clearance for planned arthroscopic surgery for a torn meniscus. He denies temperature chills sweats myalgias headache sinus congestion sore throat cough chest pain pleurisy wheezing shortness of breath exertional dyspnea lightheadedness dizziness vertigo or syncope he said no abdominal pain his bowels have been moving well recently no diarrhea no bright red blood per rectum and no black stools his appetite has been good his weight is stable the knee left is the only musculoskeletal complaint he has

## 2018-12-13 NOTE — REVIEW OF SYSTEMS
[Negative] : Heme/Lymph [Heartburn] : heartburn [Fever] : no fever [Chills] : no chills [Fatigue] : no fatigue [Hot Flashes] : no hot flashes [Night Sweats] : no night sweats [Recent Change In Weight] : ~T no recent weight change [Discharge] : no discharge [Pain] : no pain [Redness] : no redness [Dryness] : no dryness [Vision Problems] : no vision problems [Itching] : no itching [Earache] : no earache [Hearing Loss] : no hearing loss [Nosebleeds] : no nosebleeds [Postnasal Drip] : no postnasal drip [Nasal Discharge] : no nasal discharge [Sore Throat] : no sore throat [Hoarseness] : no hoarseness [Chest Pain] : no chest pain [Palpitations] : no palpitations [Claudication] : no  leg claudication [Lower Ext Edema] : no lower extremity edema [Orthopena] : no orthopnea [Paroysmal Nocturnal Dyspnea] : no paroysmal nocturnal dyspnea [Shortness Of Breath] : no shortness of breath [Wheezing] : no wheezing [Cough] : no cough [Dyspnea on Exertion] : not dyspnea on exertion [Abdominal Pain] : no abdominal pain [Nausea] : no nausea [Constipation] : no constipation [Diarrhea] : no diarrhea [Vomiting] : no vomiting [Melena] : no melena [Dysuria] : no dysuria [Incontinence] : no incontinence [Hesitancy] : no hesitancy [Nocturia] : no nocturia [Hematuria] : no hematuria [Frequency] : no frequency [Impotence] : no impotency [Poor Libido] : libido not poor [Joint Pain] : no joint pain [Joint Stiffness] : no joint stiffness [Muscle Pain] : no muscle pain [Muscle Weakness] : no muscle weakness [Back Pain] : no back pain [Joint Swelling] : no joint swelling [Mole Changes] : no mole changes [Nail Changes] : no nail changes [Hair Changes] : no hair changes [Skin Rash] : no skin rash [Headache] : no headache [Dizziness] : no dizziness [Fainting] : no fainting [Confusion] : no confusion [Unsteady Walk] : no ataxia [Memory Loss] : no memory loss [Suicidal] : not suicidal [Insomnia] : no insomnia [Anxiety] : no anxiety [Depression] : no depression [Easy Bleeding] : no easy bleeding [Easy Bruising] : no easy bruising [Swollen Glands] : no swollen glands [FreeTextEntry9] : knee

## 2018-12-13 NOTE — ASSESSMENT
[No Further Testing Recommended] : no further testing recommended [As per surgery] : as per surgery [Patient Optimized for Surgery] : Patient optimized for surgery [Continue medications as is] : Continue current medications [FreeTextEntry4] : Physical exam shows a well-developed man in no acute distress blood pressure 128/74 height 6 feet one weight 70 pounds heart rate 78 respiratory rate of 15 HEENT was unremarkable chest was clear cardiovascular was regular with no murmurs abdomen is soft and nontender extremities showed no clubbing cyanosis or edema neurologic exam was nonfocal EKG showed normal sinus rhythm with no acute ST-T wave changes/normal record preop blood tests were ordered and reviewed without abnormality chest x-ray without acute pathology he is cleared medically for planned arthroscopic surgery

## 2018-12-17 ENCOUNTER — LABORATORY RESULT (OUTPATIENT)
Age: 63
End: 2018-12-17

## 2018-12-18 LAB
25(OH)D3 SERPL-MCNC: 39.8 NG/ML
ALBUMIN SERPL ELPH-MCNC: 4.7 G/DL
ALP BLD-CCNC: 49 U/L
ALT SERPL-CCNC: 18 U/L
ANION GAP SERPL CALC-SCNC: 12 MMOL/L
APPEARANCE: ABNORMAL
APTT BLD: 34.6 SEC
AST SERPL-CCNC: 20 U/L
BASOPHILS # BLD AUTO: 0.02 K/UL
BASOPHILS NFR BLD AUTO: 0.5 %
BILIRUB SERPL-MCNC: 0.7 MG/DL
BILIRUBIN URINE: NEGATIVE
BLOOD URINE: NEGATIVE
BUN SERPL-MCNC: 14 MG/DL
CALCIUM SERPL-MCNC: 9.9 MG/DL
CHLORIDE SERPL-SCNC: 102 MMOL/L
CHOLEST SERPL-MCNC: 225 MG/DL
CHOLEST/HDLC SERPL: 3.3 RATIO
CO2 SERPL-SCNC: 26 MMOL/L
COLOR: YELLOW
CREAT SERPL-MCNC: 0.88 MG/DL
CRP SERPL HS-MCNC: 0.5 MG/L
EOSINOPHIL # BLD AUTO: 0.13 K/UL
EOSINOPHIL NFR BLD AUTO: 3.3 %
GLUCOSE BS SERPL-MCNC: 92 MG/DL
GLUCOSE QUALITATIVE U: NEGATIVE MG/DL
GLUCOSE SERPL-MCNC: 97 MG/DL
HBA1C MFR BLD HPLC: 5.5 %
HCT VFR BLD CALC: 44.2 %
HDLC SERPL-MCNC: 69 MG/DL
HGB BLD-MCNC: 14.3 G/DL
IMM GRANULOCYTES NFR BLD AUTO: 0 %
INR PPP: 1.02 RATIO
KETONES URINE: NEGATIVE
LDLC SERPL CALC-MCNC: 143 MG/DL
LEUKOCYTE ESTERASE URINE: NEGATIVE
LYMPHOCYTES # BLD AUTO: 0.87 K/UL
LYMPHOCYTES NFR BLD AUTO: 22.1 %
MAN DIFF?: NORMAL
MCHC RBC-ENTMCNC: 30.6 PG
MCHC RBC-ENTMCNC: 32.4 GM/DL
MCV RBC AUTO: 94.6 FL
MONOCYTES # BLD AUTO: 0.58 K/UL
MONOCYTES NFR BLD AUTO: 14.7 %
NEUTROPHILS # BLD AUTO: 2.34 K/UL
NEUTROPHILS NFR BLD AUTO: 59.4 %
NITRITE URINE: NEGATIVE
PH URINE: 7
PLATELET # BLD AUTO: 250 K/UL
POTASSIUM SERPL-SCNC: 4.5 MMOL/L
PROT SERPL-MCNC: 7 G/DL
PROTEIN URINE: NEGATIVE MG/DL
PSA FREE FLD-MCNC: 41 %
PSA FREE SERPL-MCNC: 0.31 NG/ML
PSA SERPL-MCNC: 0.75 NG/ML
PT BLD: 11.4 SEC
RBC # BLD: 4.67 M/UL
RBC # FLD: 13.6 %
SODIUM SERPL-SCNC: 140 MMOL/L
SPECIFIC GRAVITY URINE: 1.02
T4 FREE SERPL-MCNC: 1.2 NG/DL
TRIGL SERPL-MCNC: 66 MG/DL
TSH SERPL-ACNC: 2.74 UIU/ML
UROBILINOGEN URINE: NEGATIVE MG/DL
VIT B12 SERPL-MCNC: 677 PG/ML
WBC # FLD AUTO: 3.94 K/UL

## 2019-01-02 LAB
TESTOST BND SERPL-MCNC: 10.4 PG/ML
TESTOST SERPL-MCNC: 446.1 NG/DL

## 2019-01-11 ENCOUNTER — APPOINTMENT (OUTPATIENT)
Dept: INTERNAL MEDICINE | Facility: CLINIC | Age: 64
End: 2019-01-11

## 2019-05-05 ENCOUNTER — EMERGENCY (EMERGENCY)
Facility: HOSPITAL | Age: 64
LOS: 1 days | Discharge: HOME | End: 2019-05-05
Attending: EMERGENCY MEDICINE | Admitting: EMERGENCY MEDICINE
Payer: COMMERCIAL

## 2019-05-05 VITALS
HEIGHT: 73 IN | TEMPERATURE: 98 F | RESPIRATION RATE: 18 BRPM | WEIGHT: 163.14 LBS | DIASTOLIC BLOOD PRESSURE: 76 MMHG | OXYGEN SATURATION: 100 % | HEART RATE: 74 BPM | SYSTOLIC BLOOD PRESSURE: 112 MMHG

## 2019-05-05 VITALS
HEART RATE: 72 BPM | OXYGEN SATURATION: 99 % | RESPIRATION RATE: 17 BRPM | SYSTOLIC BLOOD PRESSURE: 119 MMHG | DIASTOLIC BLOOD PRESSURE: 71 MMHG

## 2019-05-05 LAB — GLUCOSE BLDC GLUCOMTR-MCNC: 73 MG/DL — SIGNIFICANT CHANGE UP (ref 70–99)

## 2019-05-05 PROCEDURE — 99284 EMERGENCY DEPT VISIT MOD MDM: CPT | Mod: 25

## 2019-05-05 PROCEDURE — 82962 GLUCOSE BLOOD TEST: CPT

## 2019-05-05 PROCEDURE — 93010 ELECTROCARDIOGRAM REPORT: CPT

## 2019-05-05 PROCEDURE — 93005 ELECTROCARDIOGRAM TRACING: CPT

## 2019-05-05 PROCEDURE — 99283 EMERGENCY DEPT VISIT LOW MDM: CPT

## 2019-05-05 NOTE — ED PROVIDER NOTE - PROGRESS NOTE DETAILS
neuro exam repeated after almost 2hr stay; pt feels improved. No focal findings. No new chest pain; no headache; no new paresthesias. Will discharge. Advised to return prn worsening.

## 2019-05-05 NOTE — ED PROVIDER NOTE - CLINICAL SUMMARY MEDICAL DECISION MAKING FREE TEXT BOX
Pt with numbness to the chin and lip. Resolved. No pain. Patient examination is normal. No neurological deficits or findings. EKG normal. Finger stick normal. Advised to return prn worsening. Offered labs to check lytes (ie: Mag, K+, Calcium) but patient declined and states he rather f/u outpt. Given normal exam, patient understands, is satisfied and agrees that due to the isolated chin complaint (minor), lack of symptoms at this time, and his improvement, CT not warranted. Understands the need for return if there is worsening of sx. Patient also advised to f/u with PCP prn concern.

## 2019-05-05 NOTE — ED ADULT TRIAGE NOTE - CHIEF COMPLAINT QUOTE
A little bit after 1 AM, I felt numbness in the midline of my tongue and chin. The numbness of the tongue is now gone"

## 2019-05-05 NOTE — ED PROVIDER NOTE - CARE PROVIDER_API CALL
Andrey Mayo)  Gastroenterology; Internal Medicine  [address] [place] [address], [address]  [place], [place] [id number]  Phone: [phone]  Fax: [phone]  Follow Up Time:

## 2019-05-05 NOTE — ED PROVIDER NOTE - CRANIAL NERVE AND PUPILLARY EXAM
cranial nerves 2-12 intact/central and peripheral vision intact/extra-ocular movements intact/gag reflex intact/central vision intact/corneal reflex intact/tongue is midline

## 2019-05-05 NOTE — ED PROVIDER NOTE - OBJECTIVE STATEMENT
Patient states that he awoke with numb feeling to the midline of his lip and chin. The lip resolved first and now, his chin is improved. No chest pain or sob. No loss of function or swelling. No headache, lightheadedness, dizziness. He said he checked himself in the mirror and did not see any swelling or asymmetry.   No other areas of the body with numbness or change in sensation or strength. No PMH. Patient states that he awoke with numb feeling to the midline of his lip and chin. The lip resolved first and now, his chin is improved. No chest pain or sob. No loss of function or swelling. No headache, lightheadedness, dizziness. He said he checked himself in the mirror and did not see any swelling or asymmetry.   No other areas of the body with numbness or change in sensation or strength. No PMH; notes arthritis history - like cervical spine arthritis and knee arthritis b/l.

## 2019-05-17 PROBLEM — Z78.9 OTHER SPECIFIED HEALTH STATUS: Chronic | Status: ACTIVE | Noted: 2019-05-05

## 2019-05-29 ENCOUNTER — RESULT CHARGE (OUTPATIENT)
Age: 64
End: 2019-05-29

## 2019-05-30 ENCOUNTER — MEDICATION RENEWAL (OUTPATIENT)
Age: 64
End: 2019-05-30

## 2019-05-30 ENCOUNTER — APPOINTMENT (OUTPATIENT)
Dept: INTERNAL MEDICINE | Facility: CLINIC | Age: 64
End: 2019-05-30
Payer: COMMERCIAL

## 2019-05-30 VITALS
TEMPERATURE: 98.4 F | HEART RATE: 63 BPM | SYSTOLIC BLOOD PRESSURE: 110 MMHG | RESPIRATION RATE: 16 BRPM | OXYGEN SATURATION: 100 % | DIASTOLIC BLOOD PRESSURE: 70 MMHG | BODY MASS INDEX: 21.87 KG/M2 | HEIGHT: 73 IN | WEIGHT: 165 LBS

## 2019-05-30 VITALS
RESPIRATION RATE: 15 BRPM | HEART RATE: 66 BPM | DIASTOLIC BLOOD PRESSURE: 70 MMHG | WEIGHT: 165 LBS | SYSTOLIC BLOOD PRESSURE: 110 MMHG | HEIGHT: 73 IN | BODY MASS INDEX: 21.87 KG/M2 | TEMPERATURE: 98.4 F

## 2019-05-30 DIAGNOSIS — R42 DIZZINESS AND GIDDINESS: ICD-10-CM

## 2019-05-30 PROCEDURE — 99396 PREV VISIT EST AGE 40-64: CPT

## 2019-06-01 ENCOUNTER — LABORATORY RESULT (OUTPATIENT)
Age: 64
End: 2019-06-01

## 2019-06-07 LAB
25(OH)D3 SERPL-MCNC: 33.8 NG/ML
ALBUMIN SERPL ELPH-MCNC: 4.4 G/DL
ALP BLD-CCNC: 48 U/L
ALT SERPL-CCNC: 14 U/L
ANION GAP SERPL CALC-SCNC: 12 MMOL/L
APPEARANCE: ABNORMAL
AST SERPL-CCNC: 14 U/L
BASOPHILS # BLD AUTO: 0.03 K/UL
BASOPHILS NFR BLD AUTO: 0.7 %
BILIRUB SERPL-MCNC: 0.5 MG/DL
BILIRUBIN URINE: NEGATIVE
BLOOD URINE: NEGATIVE
BUN SERPL-MCNC: 11 MG/DL
CALCIUM SERPL-MCNC: 9.8 MG/DL
CHLORIDE SERPL-SCNC: 103 MMOL/L
CHOLEST SERPL-MCNC: 206 MG/DL
CHOLEST/HDLC SERPL: 3.5 RATIO
CO2 SERPL-SCNC: 28 MMOL/L
COLOR: YELLOW
CREAT SERPL-MCNC: 0.9 MG/DL
CRP SERPL HS-MCNC: 0.43 MG/L
EOSINOPHIL # BLD AUTO: 0.18 K/UL
EOSINOPHIL NFR BLD AUTO: 4.3 %
ESTIMATED AVERAGE GLUCOSE: 105 MG/DL
GLUCOSE BS SERPL-MCNC: 77 MG/DL
GLUCOSE QUALITATIVE U: NEGATIVE
GLUCOSE SERPL-MCNC: 86 MG/DL
HBA1C MFR BLD HPLC: 5.3 %
HCT VFR BLD CALC: 43.2 %
HCV AB SER QL: NONREACTIVE
HCV S/CO RATIO: 0.11 S/CO
HDLC SERPL-MCNC: 59 MG/DL
HGB BLD-MCNC: 14 G/DL
IMM GRANULOCYTES NFR BLD AUTO: 0.2 %
KETONES URINE: NEGATIVE
LDLC SERPL CALC-MCNC: 129 MG/DL
LEUKOCYTE ESTERASE URINE: NEGATIVE
LYMPHOCYTES # BLD AUTO: 1.12 K/UL
LYMPHOCYTES NFR BLD AUTO: 27.1 %
MAN DIFF?: NORMAL
MCHC RBC-ENTMCNC: 31.3 PG
MCHC RBC-ENTMCNC: 32.4 GM/DL
MCV RBC AUTO: 96.4 FL
MEV IGG FLD QL IA: 12.7 AU/ML
MEV IGG+IGM SER-IMP: NEGATIVE
MONOCYTES # BLD AUTO: 0.58 K/UL
MONOCYTES NFR BLD AUTO: 14 %
MUV AB SER-ACNC: POSITIVE
MUV IGG SER QL IA: 114 AU/ML
NEUTROPHILS # BLD AUTO: 2.22 K/UL
NEUTROPHILS NFR BLD AUTO: 53.7 %
NITRITE URINE: NEGATIVE
PH URINE: 8
PLATELET # BLD AUTO: 229 K/UL
POTASSIUM SERPL-SCNC: 4.4 MMOL/L
PROT SERPL-MCNC: 6.4 G/DL
PROTEIN URINE: NEGATIVE
PSA FREE FLD-MCNC: 41 %
PSA FREE SERPL-MCNC: 0.29 NG/ML
PSA SERPL-MCNC: 0.71 NG/ML
RBC # BLD: 4.48 M/UL
RBC # FLD: 13.2 %
RUBV IGG FLD-ACNC: 14.8 INDEX
RUBV IGG SER-IMP: POSITIVE
SODIUM SERPL-SCNC: 142 MMOL/L
SPECIFIC GRAVITY URINE: 1.01
T4 FREE SERPL-MCNC: 1.1 NG/DL
TESTOST BND SERPL-MCNC: 7.1 PG/ML
TESTOST SERPL-MCNC: 469.1 NG/DL
TRIGL SERPL-MCNC: 88 MG/DL
TSH SERPL-ACNC: 4.46 UIU/ML
UROBILINOGEN URINE: NORMAL
VIT B12 SERPL-MCNC: 660 PG/ML
WBC # FLD AUTO: 4.14 K/UL

## 2019-06-27 ENCOUNTER — APPOINTMENT (OUTPATIENT)
Dept: INTERNAL MEDICINE | Facility: CLINIC | Age: 64
End: 2019-06-27
Payer: COMMERCIAL

## 2019-06-27 PROCEDURE — 90707 MMR VACCINE SC: CPT

## 2019-06-27 PROCEDURE — 90471 IMMUNIZATION ADMIN: CPT

## 2020-04-17 ENCOUNTER — APPOINTMENT (OUTPATIENT)
Dept: INTERNAL MEDICINE | Facility: CLINIC | Age: 65
End: 2020-04-17
Payer: COMMERCIAL

## 2020-04-17 PROCEDURE — 99215 OFFICE O/P EST HI 40 MIN: CPT | Mod: 95

## 2020-04-19 RX ORDER — FAMOTIDINE 20 MG/1
20 TABLET, FILM COATED ORAL
Refills: 0 | Status: DISCONTINUED | COMMUNITY
End: 2020-04-19

## 2020-04-19 RX ORDER — MONTELUKAST SODIUM 5 MG/1
5 TABLET, CHEWABLE ORAL
Qty: 60 | Refills: 0 | Status: DISCONTINUED | COMMUNITY
Start: 2020-03-24 | End: 2020-04-19

## 2020-04-19 RX ORDER — CELECOXIB 200 MG/1
200 CAPSULE ORAL
Qty: 5 | Refills: 0 | Status: DISCONTINUED | COMMUNITY
Start: 2019-12-05 | End: 2020-04-19

## 2020-04-19 NOTE — REVIEW OF SYSTEMS
[Cough] : cough [Nocturia] : nocturia [Impotence] : impotence [Joint Pain] : joint pain [Joint Stiffness] : joint stiffness [Anxiety] : anxiety [Negative] : Heme/Lymph [Fever] : no fever [Chills] : no chills [Fatigue] : no fatigue [Hot Flashes] : no hot flashes [Night Sweats] : no night sweats [Recent Change In Weight] : ~T no recent weight change [Discharge] : no discharge [Pain] : no pain [Redness] : no redness [Dryness] : no dryness [Vision Problems] : no vision problems [Itching] : no itching [Earache] : no earache [Hearing Loss] : no hearing loss [Nosebleeds] : no nosebleeds [Postnasal Drip] : no postnasal drip [Nasal Discharge] : no nasal discharge [Sore Throat] : no sore throat [Hoarseness] : no hoarseness [Chest Pain] : no chest pain [Palpitations] : no palpitations [Claudication] : no  leg claudication [Lower Ext Edema] : no lower extremity edema [Orthopena] : no orthopnea [Shortness Of Breath] : no shortness of breath [Wheezing] : no wheezing [Dyspnea on Exertion] : not dyspnea on exertion [Abdominal Pain] : no abdominal pain [Nausea] : no nausea [Constipation] : no constipation [Diarrhea] : no diarrhea [Vomiting] : no vomiting [Heartburn] : no heartburn [Melena] : no melena [Dysuria] : no dysuria [Incontinence] : no incontinence [Hesitancy] : no hesitancy [Hematuria] : no hematuria [Frequency] : no frequency [Poor Libido] : libido not poor [Muscle Pain] : no muscle pain [Muscle Weakness] : no muscle weakness [Back Pain] : no back pain [Joint Swelling] : no joint swelling [Itching] : no itching [Mole Changes] : no mole changes [Nail Changes] : no nail changes [Hair Changes] : no hair changes [Skin Rash] : no skin rash [Headache] : no headache [Dizziness] : no dizziness [Fainting] : no fainting [Confusion] : no confusion [Unsteady Walk] : no ataxia [Memory Loss] : no memory loss [Suicidal] : not suicidal [Insomnia] : no insomnia [Depression] : no depression [Easy Bleeding] : no easy bleeding [Easy Bruising] : no easy bruising [Swollen Glands] : no swollen glands [FreeTextEntry6] : chronic mild cough [FreeTextEntry9] : knee and shoulders

## 2020-04-19 NOTE — ASSESSMENT
[FreeTextEntry1] : He is concerned about his risk for that rotavirus which include his age sex and his mild asthma he will continue his Singulair and will add Flovent inhaler. Patient is performing social isolation leaves the house only when necessary. He was advised to call for any worsening of his respiratory symptoms temperature bodyaches loss of taste and smell abdominal pain or shortness of breath. Patient will schedule a complete physical at which time we will get complete blood test and review his overall health he will wait a couple of months until the corona virus has lessened

## 2020-04-19 NOTE — HEALTH RISK ASSESSMENT
[Yes] : Yes [No falls in past year] : Patient reported no falls in the past year [0] : 1) Little interest or pleasure doing things: Not at all (0) [] : No [[id number]] : 0

## 2020-04-19 NOTE — HISTORY OF PRESENT ILLNESS
[Home] : at home, [unfilled] , at the time of the visit. [Medical Office: ([facility])___] : at the medical office located in  [Patient] : the patient [Self] : self [FreeTextEntry1] : TELEHEALTH VIDEO visit.. to review his medications and discuss his overall health recent issues with his chronic allergies and mild asthma [de-identified] : TELEHEALTH visit/video///64-year-old man with a history of allergies mild asthma hemorrhoids osteoarthritis performs a video visit for followup patient has been complaining of allergies sometimes and currently is kept in check with Allegra and montelukast however he feels that his asthma may kick in and he is concerned about that in the setting of  COVID 19. She denies temperature chills sweats or myalgias he's had no headache sinus congestion sore throat  wheezing pleurisy chest pain shortness of breath exertional dyspnea lightheadedness palpitations dizziness vertigo or syncope. He does have a chronic mouth he denies abdominal pain nausea vomiting has occasional constipation denies diarrhea has occasional acid reflux denies bright red blood per rectum or black stools appetite has been good his weight has been stable denies significant musculoskeletal complaints at present does have occasional pains in his back and his knees he denies dysuria or gross hematuria he has had no leg edema he denies any recent skin rashes

## 2020-05-27 ENCOUNTER — LABORATORY RESULT (OUTPATIENT)
Age: 65
End: 2020-05-27

## 2020-05-29 ENCOUNTER — APPOINTMENT (OUTPATIENT)
Dept: INTERNAL MEDICINE | Facility: CLINIC | Age: 65
End: 2020-05-29
Payer: COMMERCIAL

## 2020-05-29 LAB
25(OH)D3 SERPL-MCNC: 49.9 NG/ML
ALBUMIN SERPL ELPH-MCNC: 4.6 G/DL
ALP BLD-CCNC: 46 U/L
ALT SERPL-CCNC: 15 U/L
ANION GAP SERPL CALC-SCNC: 12 MMOL/L
APPEARANCE: ABNORMAL
AST SERPL-CCNC: 17 U/L
BASOPHILS # BLD AUTO: 0.03 K/UL
BASOPHILS NFR BLD AUTO: 0.7 %
BILIRUB SERPL-MCNC: 0.7 MG/DL
BILIRUBIN URINE: NEGATIVE
BLOOD URINE: NEGATIVE
BUN SERPL-MCNC: 13 MG/DL
CALCIUM SERPL-MCNC: 9.9 MG/DL
CHLORIDE SERPL-SCNC: 101 MMOL/L
CHOLEST SERPL-MCNC: 225 MG/DL
CHOLEST/HDLC SERPL: 3 RATIO
CO2 SERPL-SCNC: 26 MMOL/L
COLOR: NORMAL
CREAT SERPL-MCNC: 0.93 MG/DL
CRP SERPL HS-MCNC: 0.3 MG/L
EOSINOPHIL # BLD AUTO: 0.18 K/UL
EOSINOPHIL NFR BLD AUTO: 4.2 %
ESTIMATED AVERAGE GLUCOSE: 108 MG/DL
GLUCOSE BS SERPL-MCNC: 85 MG/DL
GLUCOSE QUALITATIVE U: NEGATIVE
GLUCOSE SERPL-MCNC: 95 MG/DL
HBA1C MFR BLD HPLC: 5.4 %
HCT VFR BLD CALC: 45.2 %
HDLC SERPL-MCNC: 75 MG/DL
HGB BLD-MCNC: 14.7 G/DL
IMM GRANULOCYTES NFR BLD AUTO: 0 %
KETONES URINE: NEGATIVE
LDLC SERPL CALC-MCNC: 134 MG/DL
LEUKOCYTE ESTERASE URINE: NEGATIVE
LYMPHOCYTES # BLD AUTO: 1.24 K/UL
LYMPHOCYTES NFR BLD AUTO: 29 %
MAN DIFF?: NORMAL
MCHC RBC-ENTMCNC: 31.7 PG
MCHC RBC-ENTMCNC: 32.5 GM/DL
MCV RBC AUTO: 97.4 FL
MEV IGG FLD QL IA: >300 AU/ML
MEV IGG+IGM SER-IMP: POSITIVE
MONOCYTES # BLD AUTO: 0.55 K/UL
MONOCYTES NFR BLD AUTO: 12.9 %
MUV AB SER-ACNC: POSITIVE
MUV IGG SER QL IA: 131 AU/ML
NEUTROPHILS # BLD AUTO: 2.27 K/UL
NEUTROPHILS NFR BLD AUTO: 53.2 %
NITRITE URINE: NEGATIVE
PH URINE: 8.5
PLATELET # BLD AUTO: 223 K/UL
POTASSIUM SERPL-SCNC: 4.5 MMOL/L
PROT SERPL-MCNC: 6.8 G/DL
PROTEIN URINE: NEGATIVE
PSA FREE FLD-MCNC: 40 %
PSA FREE SERPL-MCNC: 0.33 NG/ML
PSA SERPL-MCNC: 0.83 NG/ML
RBC # BLD: 4.64 M/UL
RBC # FLD: 12.7 %
RUBV IGG FLD-ACNC: 21.9 INDEX
RUBV IGG SER-IMP: POSITIVE
SARS-COV-2 IGG SERPL IA-ACNC: <0.1 INDEX
SARS-COV-2 IGG SERPL QL IA: NEGATIVE
SODIUM SERPL-SCNC: 139 MMOL/L
SPECIFIC GRAVITY URINE: 1.01
T4 FREE SERPL-MCNC: 1.1 NG/DL
TRIGL SERPL-MCNC: 79 MG/DL
TSH SERPL-ACNC: 2.97 UIU/ML
UROBILINOGEN URINE: NORMAL
VIT B12 SERPL-MCNC: 627 PG/ML
WBC # FLD AUTO: 4.27 K/UL

## 2020-05-29 PROCEDURE — 99215 OFFICE O/P EST HI 40 MIN: CPT | Mod: 95

## 2020-05-30 NOTE — ASSESSMENT
[FreeTextEntry1] : Patient was informed that colonoscopies will be starting up in June he would like to consider scheduling it in the summer of the fall he will call me to set up a colonoscopy appointment a month before that time he would like to do it the patient's blood tests were reviewed patient has no antibodies for the corona virus and blood test were unremarkable and reviewed in detail with the patient he is up-to-date with his ophthalmologist and dermatologist he is active and will try to exercise as much as possible patient was reminded to get the new shingles vaccine in to keep active with the influenza vaccine is not due yet to receive the pneumonia vaccines

## 2020-05-30 NOTE — HISTORY OF PRESENT ILLNESS
[Home] : at home, [unfilled] , at the time of the visit. [Medical Office: ([facility])___] : at the medical office located in  [Verbal consent obtained from patient] : the patient, [unfilled] [FreeTextEntry1] : TELEHEALTH video visit..to review his medications and discuss his overall health patient's asking about scheduling a colonoscopy [de-identified] : TELEHEALTH video visit..65-year-old man with a history of GERD occasional dysphasia erectile dysfunction chronic headaches lumbar degenerative disc disease constipation with hemorrhoids allergies and asthma performs a video followup patient has been moving his bowels well with very little if any hemorrhoidal bleeding he denies temperature chills sweats or myalgias he's had no headache sinus congestion sore throat cough wheezing pleurisy chest pain shortness of breath exertional dyspnea lightheadedness palpitations dizziness vertigo or syncope he has had no abdominal pain nausea vomiting diarrhea constipation bright red blood per rectum or black stools his appetite has been good his weight has been stable denies leg edema he has had chronic pain in his lower back and knees he has had no skin rashes and has been sleeping well he is calling to find out about scheduling a colonoscopy

## 2020-05-30 NOTE — HEALTH RISK ASSESSMENT
[Yes] : Yes [] : No [No falls in past year] : Patient reported no falls in the past year [0] : 2) Feeling down, depressed, or hopeless: Not at all (0) [[id number]] : o

## 2020-05-30 NOTE — REVIEW OF SYSTEMS
[Chills] : no chills [Fever] : no fever [Fatigue] : no fatigue [Night Sweats] : no night sweats [Hot Flashes] : no hot flashes [Recent Change In Weight] : ~T no recent weight change [Discharge] : no discharge [Pain] : no pain [Redness] : no redness [Vision Problems] : no vision problems [Dryness] : no dryness [Earache] : no earache [Hearing Loss] : no hearing loss [Itching] : no itching [Nasal Discharge] : no nasal discharge [Postnasal Drip] : no postnasal drip [Nosebleeds] : no nosebleeds [Hoarseness] : no hoarseness [Sore Throat] : no sore throat [Chest Pain] : no chest pain [Claudication] : no  leg claudication [Palpitations] : no palpitations [Lower Ext Edema] : no lower extremity edema [Orthopena] : no orthopnea [Shortness Of Breath] : no shortness of breath [Wheezing] : no wheezing [Abdominal Pain] : no abdominal pain [Dyspnea on Exertion] : not dyspnea on exertion [Cough] : cough [Diarrhea] : no diarrhea [Constipation] : no constipation [Nausea] : no nausea [Heartburn] : no heartburn [Vomiting] : no vomiting [Melena] : no melena [Hesitancy] : no hesitancy [Dysuria] : no dysuria [Incontinence] : no incontinence [Hematuria] : no hematuria [Frequency] : no frequency [Nocturia] : nocturia [Impotence] : impotence [Joint Pain] : joint pain [Poor Libido] : libido not poor [Muscle Pain] : no muscle pain [Joint Stiffness] : joint stiffness [Back Pain] : no back pain [Joint Swelling] : no joint swelling [Muscle Weakness] : no muscle weakness [Nail Changes] : no nail changes [Hair Changes] : no hair changes [Mole Changes] : no mole changes [Skin Rash] : no skin rash [Headache] : no headache [Dizziness] : no dizziness [Confusion] : no confusion [Unsteady Walk] : no ataxia [Fainting] : no fainting [Suicidal] : not suicidal [Insomnia] : no insomnia [Memory Loss] : no memory loss [Anxiety] : anxiety [Depression] : no depression [Easy Bleeding] : no easy bleeding [Easy Bruising] : no easy bruising [Swollen Glands] : no swollen glands [Negative] : Heme/Lymph [FreeTextEntry9] : knee and shoulders

## 2020-06-01 ENCOUNTER — TRANSCRIPTION ENCOUNTER (OUTPATIENT)
Age: 65
End: 2020-06-01

## 2020-06-16 ENCOUNTER — APPOINTMENT (OUTPATIENT)
Dept: ORTHOPEDIC SURGERY | Facility: CLINIC | Age: 65
End: 2020-06-16

## 2020-06-22 ENCOUNTER — APPOINTMENT (OUTPATIENT)
Dept: INTERNAL MEDICINE | Facility: CLINIC | Age: 65
End: 2020-06-22
Payer: COMMERCIAL

## 2020-06-22 VITALS
DIASTOLIC BLOOD PRESSURE: 70 MMHG | HEART RATE: 74 BPM | RESPIRATION RATE: 16 BRPM | BODY MASS INDEX: 21.87 KG/M2 | OXYGEN SATURATION: 99 % | WEIGHT: 165 LBS | HEIGHT: 73 IN | TEMPERATURE: 98.8 F | SYSTOLIC BLOOD PRESSURE: 122 MMHG

## 2020-06-22 PROCEDURE — G0444 DEPRESSION SCREEN ANNUAL: CPT | Mod: NC,59

## 2020-06-22 PROCEDURE — 99396 PREV VISIT EST AGE 40-64: CPT | Mod: 25

## 2020-06-22 NOTE — ASSESSMENT
[FreeTextEntry1] : Physical exam shows a well-developed man in no acute distress blood pressure 122/70 height 6 feet 1 inches weight 165 pounds BMI 21.77 temperature of 98.8 °F heart rate of 74 respirations at 15 HEENT was unremarkable chest was clear cardiovascular exam showed a normal S1 and S2 with no extra heart sounds or murmurs abdomen was soft and nontender extremities showed no edema neurologic exam was nonfocal patient had his last endoscopy in July 2018 showing no evidence of esophageal stricture and a colonoscopy in February 2015 .  Because have a strong family history of heart disease patient will be make an appointment with Dr. Woody within the next month during that time an EKG will be done and he does not wish to have one at this time patient had complete blood test in May 2020 which showed a total cholesterol of 225 HDL of 75 LDL of 134 total cholesterol HDL ratio of 3.0 he is up-to-date with his ophthalmologist and dermatologist he is up-to-date with his influenza vaccine he was suggested to acquire at his drugstore the new shingles vaccine after the age of 66 he will be due for his pneumonia shots

## 2020-06-22 NOTE — HISTORY OF PRESENT ILLNESS
[FreeTextEntry1] : 65-year-old man comes to the office for a comprehensive physical examination to review his medications and discuss his overall health recent issues with a hemorrhoid and chronic sinus congestion and allergies [de-identified] : 65-year-old man comes to the office for a comprehensive physical examination with a history of GERD cervicogenic headaches constipation asthma chronic allergies and sinus congestion lower back pain osteoarthritis of the knees erectile dysfunction and hemorrhoids patient denies temperature chills sweats or myalgias he has had no headaches recently he denies sore throat has chronic postnasal drip sometimes the mucus produced is thick he has had no sore throats wheezing cough pleurisy chest pain shortness of breath exertional dyspnea lightheadedness palpitations dizziness vertigo or syncope denies abdominal pain has occasional heartburn has a rare episode of dysphasia he denies nausea vomiting diarrhea has mild constipation and occasional uncomfortable hemorrhoids denies leg edema does get up occasionally at night to urinate but denies dysuria or gross hematuria he has had no recent skin rashes and denies any problems sleeping

## 2020-06-22 NOTE — HEALTH RISK ASSESSMENT
[Yes] : Yes [No falls in past year] : Patient reported no falls in the past year [0] : 2) Feeling down, depressed, or hopeless: Not at all (0) [(PHQ-2) Unable to screen] : PHQ-2: unable to screen [] : No [HIV test declined] : HIV test declined [ColonoscopyDate] : 02/15

## 2020-06-22 NOTE — REVIEW OF SYSTEMS
[Cough] : cough [Nocturia] : nocturia [Impotence] : impotence [Joint Stiffness] : joint stiffness [Joint Pain] : joint pain [Anxiety] : anxiety [Negative] : Neurological [Fever] : no fever [Fatigue] : no fatigue [Chills] : no chills [Hot Flashes] : no hot flashes [Night Sweats] : no night sweats [Recent Change In Weight] : ~T no recent weight change [Discharge] : no discharge [Redness] : no redness [Pain] : no pain [Dryness] : no dryness [Vision Problems] : no vision problems [Itching] : no itching [Earache] : no earache [Nosebleeds] : no nosebleeds [Hearing Loss] : no hearing loss [Postnasal Drip] : no postnasal drip [Sore Throat] : no sore throat [Nasal Discharge] : no nasal discharge [Chest Pain] : no chest pain [Palpitations] : no palpitations [Hoarseness] : no hoarseness [Lower Ext Edema] : no lower extremity edema [Claudication] : no  leg claudication [Orthopena] : no orthopnea [Shortness Of Breath] : no shortness of breath [Dyspnea on Exertion] : not dyspnea on exertion [Wheezing] : no wheezing [Nausea] : no nausea [Abdominal Pain] : no abdominal pain [Diarrhea] : no diarrhea [Constipation] : no constipation [Vomiting] : no vomiting [Heartburn] : no heartburn [Melena] : no melena [Dysuria] : no dysuria [Incontinence] : no incontinence [Hematuria] : no hematuria [Hesitancy] : no hesitancy [Muscle Pain] : no muscle pain [Poor Libido] : libido not poor [Frequency] : no frequency [Back Pain] : no back pain [Muscle Weakness] : no muscle weakness [Joint Swelling] : no joint swelling [Nail Changes] : no nail changes [Mole Changes] : no mole changes [Headache] : no headache [Hair Changes] : no hair changes [Skin Rash] : no skin rash [Dizziness] : no dizziness [Fainting] : no fainting [Memory Loss] : no memory loss [Confusion] : no confusion [Unsteady Walk] : no ataxia [Suicidal] : not suicidal [Insomnia] : no insomnia [Depression] : no depression [Easy Bleeding] : no easy bleeding [Swollen Glands] : no swollen glands [Easy Bruising] : no easy bruising [FreeTextEntry9] : knee and shoulders

## 2020-09-01 ENCOUNTER — APPOINTMENT (OUTPATIENT)
Dept: INTERNAL MEDICINE | Facility: CLINIC | Age: 65
End: 2020-09-01
Payer: COMMERCIAL

## 2020-09-01 VITALS
TEMPERATURE: 98.1 F | DIASTOLIC BLOOD PRESSURE: 64 MMHG | HEART RATE: 69 BPM | BODY MASS INDEX: 21.74 KG/M2 | RESPIRATION RATE: 16 BRPM | WEIGHT: 164 LBS | HEIGHT: 73 IN | SYSTOLIC BLOOD PRESSURE: 110 MMHG | OXYGEN SATURATION: 100 %

## 2020-09-01 DIAGNOSIS — Z23 ENCOUNTER FOR IMMUNIZATION: ICD-10-CM

## 2020-09-01 DIAGNOSIS — Z77.120 CONTACT WITH AND (SUSPECTED) EXPOSURE TO MOLD (TOXIC): ICD-10-CM

## 2020-09-01 PROCEDURE — G0444 DEPRESSION SCREEN ANNUAL: CPT

## 2020-09-01 PROCEDURE — 90686 IIV4 VACC NO PRSV 0.5 ML IM: CPT

## 2020-09-01 PROCEDURE — 99215 OFFICE O/P EST HI 40 MIN: CPT | Mod: 25

## 2020-09-01 PROCEDURE — 90471 IMMUNIZATION ADMIN: CPT

## 2020-09-01 RX ORDER — MONTELUKAST 10 MG/1
10 TABLET, FILM COATED ORAL
Refills: 0 | Status: DISCONTINUED | COMMUNITY
End: 2020-09-01

## 2020-09-01 RX ORDER — FEXOFENADINE HCL 60 MG
CAPSULE ORAL
Refills: 0 | Status: DISCONTINUED | COMMUNITY
End: 2020-09-01

## 2020-09-01 NOTE — HISTORY OF PRESENT ILLNESS
[FreeTextEntry1] : 65-year-old man comes to the office for follow-up to review his medications and discuss his overall health recent issues with allergies thick mucus in the back of his throat and facial dermatitis [de-identified] : Comes to the office for follow-up with a history of asthma GERD allergies erectile dysfunction dermatitis patient [place] living [place] which he has decided to move out of because he is convinced that he was exposed to mold he has moved into a new building in a new apartment believes that while in his other cottage his allergies were increased tremendously he did see an ear nose and throat specialist who is going to do a CT of the sinuses patient's facial dermatitis is exacerbated since he was living in the cottage he denies temperature chills sweats or myalgias he has had no headache sinus congestion sore throat cough wheezing pleurisy chest pain shortness of breath exertional dyspnea lightheadedness palpitations dizziness vertigo or syncope he has had no abdominal pain has occasional heartburn and still occasional feeling of dysphasia despite work-up he has had no nausea vomiting diarrhea constipation bright red blood per rectum or black stools his appetite has been good his weight is been stable he denies significant musculoskeletal complaints has had no leg edema gets up at night to urinate but denies dysuria or gross hematuria he denies difficulty sleeping

## 2020-09-01 NOTE — REVIEW OF SYSTEMS
[Nocturia] : nocturia [Impotence] : impotence [Joint Pain] : joint pain [Joint Stiffness] : joint stiffness [Skin Rash] : skin rash [Anxiety] : anxiety [Negative] : Heme/Lymph [Fatigue] : fatigue [Postnasal Drip] : postnasal drip [Nasal Discharge] : nasal discharge [Hoarseness] : hoarseness [Fever] : no fever [Chills] : no chills [Hot Flashes] : no hot flashes [Night Sweats] : no night sweats [Recent Change In Weight] : ~T no recent weight change [Discharge] : no discharge [Pain] : no pain [Redness] : no redness [Dryness] : no dryness [Vision Problems] : no vision problems [Itching] : no itching [Earache] : no earache [Hearing Loss] : no hearing loss [Nosebleeds] : no nosebleeds [Sore Throat] : no sore throat [Chest Pain] : no chest pain [Palpitations] : no palpitations [Claudication] : no  leg claudication [Lower Ext Edema] : no lower extremity edema [Orthopena] : no orthopnea [Shortness Of Breath] : no shortness of breath [Wheezing] : no wheezing [Cough] : no cough [Dyspnea on Exertion] : not dyspnea on exertion [Abdominal Pain] : no abdominal pain [Nausea] : no nausea [Constipation] : no constipation [Diarrhea] : no diarrhea [Vomiting] : no vomiting [Heartburn] : no heartburn [Melena] : no melena [Dysuria] : no dysuria [Incontinence] : no incontinence [Hesitancy] : no hesitancy [Hematuria] : no hematuria [Frequency] : no frequency [Poor Libido] : libido not poor [Muscle Pain] : no muscle pain [Muscle Weakness] : no muscle weakness [Back Pain] : no back pain [Joint Swelling] : no joint swelling [Mole Changes] : no mole changes [Nail Changes] : no nail changes [Hair Changes] : no hair changes [Headache] : no headache [Dizziness] : no dizziness [Fainting] : no fainting [Confusion] : no confusion [Unsteady Walk] : no ataxia [Memory Loss] : no memory loss [Suicidal] : not suicidal [Insomnia] : no insomnia [Depression] : no depression [Easy Bleeding] : no easy bleeding [Easy Bruising] : no easy bruising [Swollen Glands] : no swollen glands [FreeTextEntry9] : knee and shoulders [de-identified] : facial dermatitis

## 2020-09-01 NOTE — ASSESSMENT
[FreeTextEntry1] : Exam shows a well-developed man in no acute distress blood pressure 110/64 temperature 98.1 °F heart rate of 69 respirations at 15 HEENT was unremarkable chest was clear cardiovascular exam was regular with no extra heart sounds or murmurs abdomen was soft and nontender extremities showed no clubbing cyanosis or edema neurologic exam was nonfocal patient will be seeing an allergist to investigate the possibility of his current worsening of allergy symptoms and facial rash secondary to mold exposure patient was inquiring about sophisticated testing which are unfamiliar to me patient had complete blood test in May 2020 he is up-to-date with his ophthalmologist and dermatologist patient's last colonoscopy was in February 2015 with an upper endoscopy in July 2018 patient's diet was reviewed and suggestions were made he is active and has recovered from a knee element and feels that he will be exercising more in the near future

## 2020-09-01 NOTE — HEALTH RISK ASSESSMENT
[Yes] : Yes [No falls in past year] : Patient reported no falls in the past year [0] : 2) Feeling down, depressed, or hopeless: Not at all (0) [] : No [[id number]] : 0

## 2020-09-01 NOTE — PHYSICAL EXAM
[No Acute Distress] : no acute distress [Well Nourished] : well nourished [Well Developed] : well developed [Well-Appearing] : well-appearing [Normal Voice/Communication] : normal voice/communication [Normal Sclera/Conjunctiva] : normal sclera/conjunctiva [PERRL] : pupils equal round and reactive to light [EOMI] : extraocular movements intact [Normal Outer Ear/Nose] : the outer ears and nose were normal in appearance [Normal Oropharynx] : the oropharynx was normal [Normal TMs] : both tympanic membranes were normal [No JVD] : no jugular venous distention [No Lymphadenopathy] : no lymphadenopathy [Supple] : supple [Thyroid Normal, No Nodules] : the thyroid was normal and there were no nodules present [No Respiratory Distress] : no respiratory distress  [No Accessory Muscle Use] : no accessory muscle use [Clear to Auscultation] : lungs were clear to auscultation bilaterally [Normal Percussion] : the chest was normal to percussion [Normal Rate] : normal rate  [Regular Rhythm] : with a regular rhythm [Normal S1, S2] : normal S1 and S2 [No Murmur] : no murmur heard [No Carotid Bruits] : no carotid bruits [No Abdominal Bruit] : a ~M bruit was not heard ~T in the abdomen [No Varicosities] : no varicosities [Pedal Pulses Present] : the pedal pulses are present [No Edema] : there was no peripheral edema [No Palpable Aorta] : no palpable aorta [No Extremity Clubbing/Cyanosis] : no extremity clubbing/cyanosis [Declined Breast Exam] : declined breast exam  [Soft] : abdomen soft [Non Tender] : non-tender [Non-distended] : non-distended [No Masses] : no abdominal mass palpated [No HSM] : no HSM [Normal Bowel Sounds] : normal bowel sounds [No Hernias] : no hernias [Declined Rectal Exam] : declined rectal exam [Normal Supraclavicular Nodes] : no supraclavicular lymphadenopathy [Normal Axillary Nodes] : no axillary lymphadenopathy [Normal Posterior Cervical Nodes] : no posterior cervical lymphadenopathy [Normal Anterior Cervical Nodes] : no anterior cervical lymphadenopathy [Normal Inguinal Nodes] : no inguinal lymphadenopathy [Normal Femoral Nodes] : no femoral lymphadenopathy [No CVA Tenderness] : no CVA  tenderness [No Spinal Tenderness] : no spinal tenderness [No Joint Swelling] : no joint swelling [Grossly Normal Strength/Tone] : grossly normal strength/tone [No Rash] : no rash [No Skin Lesions] : no skin lesions [Coordination Grossly Intact] : coordination grossly intact [No Focal Deficits] : no focal deficits [Normal Gait] : normal gait [Speech Grossly Normal] : speech grossly normal [Memory Grossly Normal] : memory grossly normal [Normal Affect] : the affect was normal [Alert and Oriented x3] : oriented to person, place, and time [Normal Mood] : the mood was normal [Normal Insight/Judgement] : insight and judgment were intact [Kyphosis] : no kyphosis [Scoliosis] : no scoliosis [Acne] : no acne

## 2020-09-22 ENCOUNTER — NON-APPOINTMENT (OUTPATIENT)
Age: 65
End: 2020-09-22

## 2020-09-22 ENCOUNTER — APPOINTMENT (OUTPATIENT)
Dept: INTERNAL MEDICINE | Facility: CLINIC | Age: 65
End: 2020-09-22
Payer: COMMERCIAL

## 2020-09-22 VITALS
RESPIRATION RATE: 16 BRPM | WEIGHT: 164 LBS | BODY MASS INDEX: 21.74 KG/M2 | DIASTOLIC BLOOD PRESSURE: 62 MMHG | TEMPERATURE: 98.1 F | OXYGEN SATURATION: 99 % | SYSTOLIC BLOOD PRESSURE: 102 MMHG | HEIGHT: 73 IN | HEART RATE: 68 BPM

## 2020-09-22 DIAGNOSIS — M25.562 PAIN IN LEFT KNEE: ICD-10-CM

## 2020-09-22 PROCEDURE — 93000 ELECTROCARDIOGRAM COMPLETE: CPT

## 2020-09-22 PROCEDURE — 99215 OFFICE O/P EST HI 40 MIN: CPT | Mod: 25

## 2020-09-22 RX ORDER — HYALURONATE SODIUM, STABILIZED 60 MG/3 ML
60 SYRINGE (ML) INTRAARTICULAR
Qty: 1 | Refills: 0 | Status: DISCONTINUED | COMMUNITY
Start: 2020-04-21 | End: 2020-09-22

## 2020-09-22 RX ORDER — FLUTICASONE PROPIONATE 110 UG/1
110 AEROSOL, METERED RESPIRATORY (INHALATION) TWICE DAILY
Qty: 1 | Refills: 3 | Status: DISCONTINUED | COMMUNITY
Start: 2020-04-17 | End: 2020-09-22

## 2020-09-22 NOTE — PHYSICAL EXAM
[No Acute Distress] : no acute distress [Well Nourished] : well nourished [Well Developed] : well developed [Well-Appearing] : well-appearing [Normal Voice/Communication] : normal voice/communication [Normal Sclera/Conjunctiva] : normal sclera/conjunctiva [PERRL] : pupils equal round and reactive to light [EOMI] : extraocular movements intact [Normal Outer Ear/Nose] : the outer ears and nose were normal in appearance [Normal Oropharynx] : the oropharynx was normal [Normal TMs] : both tympanic membranes were normal [No JVD] : no jugular venous distention [No Lymphadenopathy] : no lymphadenopathy [Supple] : supple [Thyroid Normal, No Nodules] : the thyroid was normal and there were no nodules present [No Respiratory Distress] : no respiratory distress  [No Accessory Muscle Use] : no accessory muscle use [Clear to Auscultation] : lungs were clear to auscultation bilaterally [Normal Percussion] : the chest was normal to percussion [Normal Rate] : normal rate  [Regular Rhythm] : with a regular rhythm [Normal S1, S2] : normal S1 and S2 [No Murmur] : no murmur heard [No Carotid Bruits] : no carotid bruits [No Abdominal Bruit] : a ~M bruit was not heard ~T in the abdomen [No Varicosities] : no varicosities [Pedal Pulses Present] : the pedal pulses are present [No Edema] : there was no peripheral edema [No Palpable Aorta] : no palpable aorta [No Extremity Clubbing/Cyanosis] : no extremity clubbing/cyanosis [Declined Breast Exam] : declined breast exam  [Soft] : abdomen soft [Non Tender] : non-tender [Non-distended] : non-distended [No Masses] : no abdominal mass palpated [No HSM] : no HSM [Normal Bowel Sounds] : normal bowel sounds [No Hernias] : no hernias [Declined Rectal Exam] : declined rectal exam [Normal Supraclavicular Nodes] : no supraclavicular lymphadenopathy [Normal Axillary Nodes] : no axillary lymphadenopathy [Normal Posterior Cervical Nodes] : no posterior cervical lymphadenopathy [Normal Anterior Cervical Nodes] : no anterior cervical lymphadenopathy [Normal Inguinal Nodes] : no inguinal lymphadenopathy [Normal Femoral Nodes] : no femoral lymphadenopathy [No CVA Tenderness] : no CVA  tenderness [No Spinal Tenderness] : no spinal tenderness [Kyphosis] : no kyphosis [Scoliosis] : no scoliosis [No Joint Swelling] : no joint swelling [Grossly Normal Strength/Tone] : grossly normal strength/tone [No Rash] : no rash [No Skin Lesions] : no skin lesions [Acne] : no acne [Coordination Grossly Intact] : coordination grossly intact [No Focal Deficits] : no focal deficits [Normal Gait] : normal gait [Speech Grossly Normal] : speech grossly normal [Memory Grossly Normal] : memory grossly normal [Normal Affect] : the affect was normal [Alert and Oriented x3] : oriented to person, place, and time [Normal Mood] : the mood was normal [Normal Insight/Judgement] : insight and judgment were intact

## 2020-09-22 NOTE — RESULTS/DATA
[] : results reviewed [de-identified] : nl [de-identified] : mild elevation PTT  not significant [de-identified] : nsr no acute changes

## 2020-09-22 NOTE — ASSESSMENT
[Patient Optimized for Surgery] : Patient optimized for surgery [No Further Testing Recommended] : no further testing recommended [Continue medications as is] : Continue current medications [As per surgery] : as per surgery [FreeTextEntry4] : Physical exam shows a well-developed man in no acute distress blood pressure 102/62 height 6 feet 1 inches weight 164 pounds BMI 21.64 temperature of 98.1 °F heart rate of 68 respirations at 15 HEENT was unremarkable chest was clear cardiovascular exam was regular abdomen was soft and nontender extremities showed no clubbing cyanosis or edema neurologic exam was nonfocal EKG showed a normal sinus rhythm with no acute ST-T wave changes patient's CBC was normal PT INR was normal there was mild insignificant elevation of his PTT..... He is cleared medically for planned left knee arthroscopy

## 2020-09-22 NOTE — HISTORY OF PRESENT ILLNESS
[No Pertinent Cardiac History] : no history of aortic stenosis, atrial fibrillation, coronary artery disease, recent myocardial infarction, or implantable device/pacemaker [No Pertinent Pulmonary History] : no history of asthma, COPD, sleep apnea, or smoking [Asthma] : asthma [No Adverse Anesthesia Reaction] : no adverse anesthesia reaction in self or family member [(Patient denies any chest pain, claudication, dyspnea on exertion, orthopnea, palpitations or syncope)] : Patient denies any chest pain, claudication, dyspnea on exertion, orthopnea, palpitations or syncope [Aortic Stenosis] : no aortic stenosis [Atrial Fibrillation] : no atrial fibrillation [Coronary Artery Disease] : no coronary artery disease [Recent Myocardial Infarction] : no recent myocardial infarction [Implantable Device/Pacemaker] : no implantable device/pacemaker [COPD] : no COPD [Sleep Apnea] : no sleep apnea [Smoker] : not a smoker [Family Member] : no family member with adverse anesthesia reaction/sudden death [Self] : no previous adverse anesthesia reaction [Chronic Anticoagulation] : no chronic anticoagulation [Chronic Kidney Disease] : no chronic kidney disease [Diabetes] : no diabetes [FreeTextEntry1] : leftv knee arthroscopy [FreeTextEntry2] : 09/28/2020 [FreeTextEntry3] : dr sun [FreeTextEntry4] : 65-year-old man comes to the office for medical clearance for planned left knee arthroscopy with possible lateral meniscectomy to be performed at [facility] for special surgery on September 28, 2020 by Dr. Sultana patient has been complaining of discomfort in his left knee he denies temperature chills sweats or myalgia has occasional headaches denies sinus congestion but has chronic postnasal drip and clearing cough from his throat he denies sore throat wheezing pleurisy chest pain shortness of breath exertional dyspnea lightheadedness dizziness vertigo or syncope he has had no recent abdominal pain gets occasional heartburn has mild constipation denies diarrhea nausea vomiting bright red blood per rectum or black stool his appetite has been good his weight is been stable his chronic discomfort in his back knee he gets up at night to urinate but denies dysuria or gross hematuria he has had no recent leg edema skin rashes and usually sleeps well

## 2020-09-22 NOTE — REVIEW OF SYSTEMS
[Nasal Discharge] : nasal discharge [Hoarseness] : hoarseness [Nocturia] : nocturia [Impotence] : impotence [Joint Pain] : joint pain [Joint Stiffness] : joint stiffness [Anxiety] : anxiety [Negative] : Heme/Lymph [Fever] : no fever [Chills] : no chills [Fatigue] : no fatigue [Hot Flashes] : no hot flashes [Night Sweats] : no night sweats [Recent Change In Weight] : ~T no recent weight change [Discharge] : no discharge [Pain] : no pain [Redness] : no redness [Dryness] : no dryness [Vision Problems] : no vision problems [Itching] : no itching [Earache] : no earache [Hearing Loss] : no hearing loss [Nosebleeds] : no nosebleeds [Postnasal Drip] : no postnasal drip [Sore Throat] : no sore throat [Chest Pain] : no chest pain [Palpitations] : no palpitations [Claudication] : no  leg claudication [Lower Ext Edema] : no lower extremity edema [Orthopena] : no orthopnea [Shortness Of Breath] : no shortness of breath [Wheezing] : no wheezing [Cough] : no cough [Dyspnea on Exertion] : not dyspnea on exertion [Abdominal Pain] : no abdominal pain [Nausea] : no nausea [Constipation] : no constipation [Diarrhea] : no diarrhea [Vomiting] : no vomiting [Heartburn] : no heartburn [Melena] : no melena [Dysuria] : no dysuria [Incontinence] : no incontinence [Hesitancy] : no hesitancy [Hematuria] : no hematuria [Frequency] : no frequency [Poor Libido] : libido not poor [Muscle Pain] : no muscle pain [Muscle Weakness] : no muscle weakness [Back Pain] : no back pain [Joint Swelling] : no joint swelling [Mole Changes] : no mole changes [Nail Changes] : no nail changes [Hair Changes] : no hair changes [Skin Rash] : no skin rash [Headache] : no headache [Dizziness] : no dizziness [Fainting] : no fainting [Confusion] : no confusion [Unsteady Walk] : no ataxia [Memory Loss] : no memory loss [Suicidal] : not suicidal [Insomnia] : no insomnia [Depression] : no depression [Easy Bleeding] : no easy bleeding [Easy Bruising] : no easy bruising [Swollen Glands] : no swollen glands [FreeTextEntry9] : knee and shoulders

## 2020-11-16 RX ORDER — HYDROCORTISONE 25 MG/G
2.5 CREAM TOPICAL
Qty: 1 | Refills: 5 | Status: DISCONTINUED | COMMUNITY
Start: 2020-02-21 | End: 2020-11-16

## 2021-05-20 ENCOUNTER — APPOINTMENT (OUTPATIENT)
Dept: ORTHOPEDIC SURGERY | Facility: CLINIC | Age: 66
End: 2021-05-20
Payer: MEDICARE

## 2021-05-20 VITALS — WEIGHT: 165 LBS | BODY MASS INDEX: 21.87 KG/M2 | HEIGHT: 73 IN

## 2021-05-20 PROCEDURE — 99213 OFFICE O/P EST LOW 20 MIN: CPT

## 2021-05-20 PROCEDURE — 73522 X-RAY EXAM HIPS BI 3-4 VIEWS: CPT

## 2021-05-20 NOTE — PHYSICAL EXAM
[de-identified] : AP of the pelvis and a lateral of the right and left hips shows femoral heads are round joint spaces are well-maintained there is no evidence of arthritis no calcium in the soft tissues.  This does not exclude bursitis or tendinitis or a labral tear.. [FreeTextEntry2] : Patient is now doing much better with his knees.  Both of his knees have full extension and flex to 130 degrees.  He has good medial lateral and anterior posterior stability.  He has done very well with his last shot of hyaluronic acid.  There is no evidence of a Baker's cyst or no effusion in either knee.  \par \par He has a new problem today and is taken up golf.  He has pain over his left hip he feels occasional discomfort with twisting in his groin and he also has some tenderness over his greater trochanter.  His deep tendon reflexes motor and sensory are symmetric.  He can easily walk on his toes and his heels.  His hip motion at this time is full bilaterally with flexion of 135 full extension abduction to 75 adduction to 30 external rotation 75 internal rotation to 20 when he does move at the extremity of motion on the left he may feel slight discomfort in his groin which he is concerned about with his golf swing he also has some localized tenderness over his greater trochanter so he says his pain is actually in 2 places.  The tenderness over his greater trochanter may easily be consistent with trochanteric bursitis.  By his symptoms he is concerned that he could have a labral tear in his hip.

## 2021-05-20 NOTE — DISCUSSION/SUMMARY
[de-identified] : At this time the patient would like to clarify what may be bothering him however he does not appear to have any significant arthritis or arthritic changes in his hip.  He will get an MRI and if indicated and if there is evidence of trochanteric bursitis or tendinitis it can be injected by radiology at that time.  Return visit 3 to 6 months as needed.

## 2021-05-20 NOTE — HISTORY OF PRESENT ILLNESS
[de-identified] : Mr. URMILA ORTEGA is a 65 year male who presents to office complaining of left hip pain x 6-8 weeks with insidious onset.\par Denies specific injury, trauma, or fall to the hip. No previous hip pain.\par He noticed this pain around the time he started playing a lot of golf to begin the year.\par He notes aggravating pain also when going from a seated to standing position.\par Pain is described as a dull/achy pain that is over the lateral aspect of the hip and in the groin as well.\par Pain does not radiate or have any associated numbness/tingling or weakness down his legs.\par He has not tried conservative measures to help with the pain.\par Also of note, he has sensitivity with his stomach when taking NSAIDs.\par All review of systems, family history, social history, surgical history, past medical history, medications, and allergies not previously stated as positive are negative. They were reviewed by me today with the patient and documented accordingly.

## 2021-05-25 ENCOUNTER — APPOINTMENT (OUTPATIENT)
Dept: ORTHOPEDIC SURGERY | Facility: CLINIC | Age: 66
End: 2021-05-25

## 2021-06-03 ENCOUNTER — APPOINTMENT (OUTPATIENT)
Dept: MRI IMAGING | Facility: CLINIC | Age: 66
End: 2021-06-03

## 2021-06-04 ENCOUNTER — APPOINTMENT (OUTPATIENT)
Dept: INTERNAL MEDICINE | Facility: CLINIC | Age: 66
End: 2021-06-04
Payer: MEDICARE

## 2021-06-04 VITALS
RESPIRATION RATE: 16 BRPM | WEIGHT: 169 LBS | HEIGHT: 73 IN | BODY MASS INDEX: 22.4 KG/M2 | SYSTOLIC BLOOD PRESSURE: 120 MMHG | HEART RATE: 73 BPM | TEMPERATURE: 98.2 F | DIASTOLIC BLOOD PRESSURE: 64 MMHG | OXYGEN SATURATION: 100 %

## 2021-06-04 DIAGNOSIS — R13.10 DYSPHAGIA, UNSPECIFIED: ICD-10-CM

## 2021-06-04 DIAGNOSIS — M25.552 PAIN IN LEFT HIP: ICD-10-CM

## 2021-06-04 DIAGNOSIS — H69.83 OTHER SPECIFIED DISORDERS OF EUSTACHIAN TUBE, BILATERAL: ICD-10-CM

## 2021-06-04 PROCEDURE — 99215 OFFICE O/P EST HI 40 MIN: CPT

## 2021-06-07 NOTE — ASSESSMENT
[FreeTextEntry1] : Physical examination shows a well-developed man in no acute distress blood pressure 120/64 height 6 feet 1 inch weight 169 pounds BMI 22.3 temperature of 98.2 °F heart rate of 73 [place] rate of 16 oxygen saturation on room air was 100% HEENT was unremarkable chest was clear cardiovascular exam was regular with no extra heart sounds or murmurs abdomen was soft and nontender with no hepatosplenomegaly extremities showed no clubbing cyanosis or edema neurologic exam was nonfocal patient was given a slip for complete blood test including hemoglobin A1c STEPHANIE CBC comprehensive metabolic profile C-reactive profile free T4 glucose immunoelectrophoresis lipid profile PSA protein electrophoresis sed rate TSH urine analysis and vitamins D3 and B12 of note patient recently has seen his orthopedist who is ordered an MRI of his left hip he has received both doses of the new Pfizer COVID-19 vaccine and is up-to-date with his influenza vaccines he will attempt to receive the new shingles vaccine patient underwent his last upper endoscopy in 2018 and colonoscopy in 2015 he will contact his gastroenterologist concerning the timing of his next colonoscopy patient has had left hip pain patient's reflux and episodic dysphagia have been minimum patient's bowels have been moving with dietary manipulation

## 2021-06-07 NOTE — PHYSICAL EXAM
[Well Nourished] : well nourished [Well Developed] : well developed [Well-Appearing] : well-appearing [Normal Voice/Communication] : normal voice/communication [Normal Sclera/Conjunctiva] : normal sclera/conjunctiva [PERRL] : pupils equal round and reactive to light [EOMI] : extraocular movements intact [Normal Outer Ear/Nose] : the outer ears and nose were normal in appearance [Normal Oropharynx] : the oropharynx was normal [Normal TMs] : both tympanic membranes were normal [No JVD] : no jugular venous distention [No Lymphadenopathy] : no lymphadenopathy [Supple] : supple [Thyroid Normal, No Nodules] : the thyroid was normal and there were no nodules present [No Respiratory Distress] : no respiratory distress  [No Accessory Muscle Use] : no accessory muscle use [Clear to Auscultation] : lungs were clear to auscultation bilaterally [Normal Percussion] : the chest was normal to percussion [Normal Rate] : normal rate  [Regular Rhythm] : with a regular rhythm [Normal S1, S2] : normal S1 and S2 [No Murmur] : no murmur heard [No Carotid Bruits] : no carotid bruits [No Abdominal Bruit] : a ~M bruit was not heard ~T in the abdomen [No Varicosities] : no varicosities [Pedal Pulses Present] : the pedal pulses are present [No Edema] : there was no peripheral edema [No Palpable Aorta] : no palpable aorta [No Extremity Clubbing/Cyanosis] : no extremity clubbing/cyanosis [Declined Breast Exam] : declined breast exam  [Soft] : abdomen soft [Non Tender] : non-tender [Non-distended] : non-distended [No Masses] : no abdominal mass palpated [No HSM] : no HSM [Normal Bowel Sounds] : normal bowel sounds [No Hernias] : no hernias [Declined Rectal Exam] : declined rectal exam [Normal Supraclavicular Nodes] : no supraclavicular lymphadenopathy [Normal Axillary Nodes] : no axillary lymphadenopathy [Normal Posterior Cervical Nodes] : no posterior cervical lymphadenopathy [Normal Anterior Cervical Nodes] : no anterior cervical lymphadenopathy [Normal Inguinal Nodes] : no inguinal lymphadenopathy [Normal Femoral Nodes] : no femoral lymphadenopathy [No CVA Tenderness] : no CVA  tenderness [No Spinal Tenderness] : no spinal tenderness [No Joint Swelling] : no joint swelling [Grossly Normal Strength/Tone] : grossly normal strength/tone [No Rash] : no rash [No Skin Lesions] : no skin lesions [Coordination Grossly Intact] : coordination grossly intact [No Focal Deficits] : no focal deficits [Normal Gait] : normal gait [Speech Grossly Normal] : speech grossly normal [Memory Grossly Normal] : memory grossly normal [Normal Affect] : the affect was normal [Alert and Oriented x3] : oriented to person, place, and time [Normal Mood] : the mood was normal [Normal Insight/Judgement] : insight and judgment were intact [Kyphosis] : no kyphosis [Scoliosis] : no scoliosis [Acne] : no acne

## 2021-06-07 NOTE — HISTORY OF PRESENT ILLNESS
[FreeTextEntry1] : Comes to the office for follow-up to review his medications and discuss his overall health recent issues with some left hip pain [de-identified] : 66-year-old man comes to the office for follow-up with a history of gastroesophageal reflux disease with occasional dysphagia erectile dysfunction chronic headaches intermittent constipation recent left hip pain and allergies he denies temperature chills sweats or myalgias he has had no headaches sinus congestion sore throat cough wheezing pleurisy chest pain shortness of breath exertional dyspnea has some chronic hoarseness and postnasal drip denies lightheadedness palpitations dizziness vertigo or syncope he does get up at night to urinate but denies dysuria or gross hematuria he has some erectile dysfunction he has some discomfort in his knees and shoulders has had no leg edema skin rashes has chronic anxiety but has not had any problems sleeping recently

## 2021-06-07 NOTE — REVIEW OF SYSTEMS
[Postnasal Drip] : postnasal drip [Hoarseness] : hoarseness [Constipation] : constipation [Nocturia] : nocturia [Impotence] : impotence [Joint Pain] : joint pain [Joint Stiffness] : joint stiffness [Anxiety] : anxiety [Negative] : Heme/Lymph [Fever] : no fever [Chills] : no chills [Fatigue] : no fatigue [Hot Flashes] : no hot flashes [Night Sweats] : no night sweats [Recent Change In Weight] : ~T no recent weight change [Discharge] : no discharge [Pain] : no pain [Redness] : no redness [Dryness] : no dryness [Vision Problems] : no vision problems [Itching] : no itching [Earache] : no earache [Hearing Loss] : no hearing loss [Nosebleeds] : no nosebleeds [Nasal Discharge] : no nasal discharge [Sore Throat] : no sore throat [Chest Pain] : no chest pain [Palpitations] : no palpitations [Claudication] : no  leg claudication [Lower Ext Edema] : no lower extremity edema [Orthopena] : no orthopnea [Paroxysmal Nocturnal Dyspnea] : no paroxysmal nocturnal dyspnea [Shortness Of Breath] : no shortness of breath [Wheezing] : no wheezing [Cough] : no cough [Dyspnea on Exertion] : not dyspnea on exertion [Abdominal Pain] : no abdominal pain [Nausea] : no nausea [Diarrhea] : no diarrhea [Vomiting] : no vomiting [Heartburn] : no heartburn [Melena] : no melena [Dysuria] : no dysuria [Incontinence] : no incontinence [Hesitancy] : no hesitancy [Hematuria] : no hematuria [Frequency] : no frequency [Poor Libido] : libido not poor [Muscle Pain] : no muscle pain [Muscle Weakness] : no muscle weakness [Back Pain] : no back pain [Joint Swelling] : no joint swelling [Itching] : no itching [Mole Changes] : no mole changes [Nail Changes] : no nail changes [Hair Changes] : no hair changes [Skin Rash] : no skin rash [Headache] : no headache [Dizziness] : no dizziness [Fainting] : no fainting [Confusion] : no confusion [Unsteady Walk] : no ataxia [Memory Loss] : no memory loss [Suicidal] : not suicidal [Insomnia] : no insomnia [Depression] : no depression [Easy Bleeding] : no easy bleeding [Easy Bruising] : no easy bruising [Swollen Glands] : no swollen glands [FreeTextEntry9] : knee and shoulders

## 2021-06-09 ENCOUNTER — LABORATORY RESULT (OUTPATIENT)
Age: 66
End: 2021-06-09

## 2021-06-15 LAB
25(OH)D3 SERPL-MCNC: 59.2 NG/ML
ALBUMIN MFR SERPL ELPH: 65.2 %
ALBUMIN SERPL ELPH-MCNC: 4.3 G/DL
ALBUMIN SERPL-MCNC: 4 G/DL
ALBUMIN/GLOB SERPL: 1.8 RATIO
ALP BLD-CCNC: 48 U/L
ALPHA1 GLOB MFR SERPL ELPH: 2.8 %
ALPHA1 GLOB SERPL ELPH-MCNC: 0.2 G/DL
ALPHA2 GLOB MFR SERPL ELPH: 9 %
ALPHA2 GLOB SERPL ELPH-MCNC: 0.6 G/DL
ALT SERPL-CCNC: 13 U/L
ANA SER IF-ACNC: NEGATIVE
ANION GAP SERPL CALC-SCNC: 13 MMOL/L
APPEARANCE: ABNORMAL
AST SERPL-CCNC: 15 U/L
B-GLOBULIN MFR SERPL ELPH: 9.6 %
B-GLOBULIN SERPL ELPH-MCNC: 0.6 G/DL
BASOPHILS # BLD AUTO: 0.04 K/UL
BASOPHILS NFR BLD AUTO: 0.9 %
BILIRUB SERPL-MCNC: 0.4 MG/DL
BILIRUBIN URINE: NEGATIVE
BLOOD URINE: NEGATIVE
BUN SERPL-MCNC: 13 MG/DL
CALCIUM SERPL-MCNC: 9.7 MG/DL
CHLORIDE SERPL-SCNC: 104 MMOL/L
CHOLEST SERPL-MCNC: 185 MG/DL
CO2 SERPL-SCNC: 25 MMOL/L
COLOR: YELLOW
CREAT SERPL-MCNC: 1.02 MG/DL
CRP SERPL HS-MCNC: 0.46 MG/L
DEPRECATED KAPPA LC FREE/LAMBDA SER: 1.02 RATIO
EOSINOPHIL # BLD AUTO: 0.19 K/UL
EOSINOPHIL NFR BLD AUTO: 4.5 %
ERYTHROCYTE [SEDIMENTATION RATE] IN BLOOD BY WESTERGREN METHOD: 2 MM/HR
ESTIMATED AVERAGE GLUCOSE: 105 MG/DL
GAMMA GLOB FLD ELPH-MCNC: 0.8 G/DL
GAMMA GLOB MFR SERPL ELPH: 13.4 %
GLUCOSE BS SERPL-MCNC: 82 MG/DL
GLUCOSE QUALITATIVE U: NEGATIVE
GLUCOSE SERPL-MCNC: 90 MG/DL
HBA1C MFR BLD HPLC: 5.3 %
HCT VFR BLD CALC: 42.4 %
HDLC SERPL-MCNC: 58 MG/DL
HGB BLD-MCNC: 13.9 G/DL
IGA SER QL IEP: 116 MG/DL
IGG SER QL IEP: 813 MG/DL
IGM SER QL IEP: 146 MG/DL
IMM GRANULOCYTES NFR BLD AUTO: 0.2 %
INTERPRETATION SERPL IEP-IMP: NORMAL
KAPPA LC CSF-MCNC: 1.25 MG/DL
KAPPA LC SERPL-MCNC: 1.28 MG/DL
KETONES URINE: NEGATIVE
LDLC SERPL CALC-MCNC: 107 MG/DL
LEUKOCYTE ESTERASE URINE: NEGATIVE
LYMPHOCYTES # BLD AUTO: 1.01 K/UL
LYMPHOCYTES NFR BLD AUTO: 23.8 %
M PROTEIN SPEC IFE-MCNC: NORMAL
MAN DIFF?: NORMAL
MCHC RBC-ENTMCNC: 32.5 PG
MCHC RBC-ENTMCNC: 32.8 GM/DL
MCV RBC AUTO: 99.1 FL
MONOCYTES # BLD AUTO: 0.57 K/UL
MONOCYTES NFR BLD AUTO: 13.4 %
NEUTROPHILS # BLD AUTO: 2.42 K/UL
NEUTROPHILS NFR BLD AUTO: 57.2 %
NITRITE URINE: NEGATIVE
NONHDLC SERPL-MCNC: 127 MG/DL
PH URINE: 8
PLATELET # BLD AUTO: 219 K/UL
POTASSIUM SERPL-SCNC: 4.1 MMOL/L
PROT SERPL-MCNC: 6.2 G/DL
PROT SERPL-MCNC: 6.2 G/DL
PROT SERPL-MCNC: 6.4 G/DL
PROTEIN URINE: NEGATIVE
PSA FREE FLD-MCNC: 42 %
PSA FREE SERPL-MCNC: 0.35 NG/ML
PSA SERPL-MCNC: 0.85 NG/ML
RBC # BLD: 4.28 M/UL
RBC # FLD: 12.8 %
SODIUM SERPL-SCNC: 141 MMOL/L
SPECIFIC GRAVITY URINE: 1.01
T4 FREE SERPL-MCNC: 1.1 NG/DL
TRIGL SERPL-MCNC: 101 MG/DL
TSH SERPL-ACNC: 2.51 UIU/ML
UROBILINOGEN URINE: NORMAL
VIT B12 SERPL-MCNC: 639 PG/ML
WBC # FLD AUTO: 4.24 K/UL

## 2021-06-29 ENCOUNTER — RX RENEWAL (OUTPATIENT)
Age: 66
End: 2021-06-29

## 2021-06-29 ENCOUNTER — APPOINTMENT (OUTPATIENT)
Dept: INTERNAL MEDICINE | Facility: CLINIC | Age: 66
End: 2021-06-29
Payer: MEDICARE

## 2021-06-29 VITALS
HEIGHT: 73 IN | HEART RATE: 62 BPM | DIASTOLIC BLOOD PRESSURE: 62 MMHG | SYSTOLIC BLOOD PRESSURE: 118 MMHG | OXYGEN SATURATION: 99 % | RESPIRATION RATE: 16 BRPM | BODY MASS INDEX: 22.53 KG/M2 | WEIGHT: 170 LBS | TEMPERATURE: 98.1 F

## 2021-06-29 PROCEDURE — G0402 INITIAL PREVENTIVE EXAM: CPT

## 2021-06-29 RX ORDER — CHLORHEXIDINE GLUCONATE, 0.12% ORAL RINSE 1.2 MG/ML
0.12 SOLUTION DENTAL
Qty: 473 | Refills: 0 | Status: COMPLETED | COMMUNITY
Start: 2021-04-23

## 2021-06-29 RX ORDER — SILODOSIN 8 MG/1
8 CAPSULE ORAL
Qty: 30 | Refills: 0 | Status: COMPLETED | COMMUNITY
Start: 2020-12-28

## 2021-07-01 NOTE — ASSESSMENT
[FreeTextEntry1] : Physical exam shows a well-developed man in no acute distress blood pressure 118/62 height 6 feet 1 inches weight 170 pounds BMI 22.43 temperature of 98.1 °F heart rate of 62 [place] rate of 16 oxygen saturation on room air 99% HEENT was unremarkable chest was clear cardiovascular was regular with no extra heart sounds or murmurs abdomen was soft extremities showed no clubbing cyanosis or edema neurologic exam was nonfocal patient had an EKG in September 2020 patient has had an upper endoscopy in July 2018 concerning his complaints of occasional dysphagia no pathology was identified last colonoscopy was in February 2015 he is aware of the need to schedule a screening colonoscopy he is up-to-date with his ophthalmologist and dermatologist is followed orthopedically by Dr. Dewitt and has received his COVID-19 vaccination he is up-to-date with his influenza vaccine and needs to get the Shingrix vaccine and Prevnar 13 vaccine patient has had minimal headaches recently his heartburn seems well controlled patient has been anxious recently and was suggested that he try 50 mg daily also complaining of allergies and montelukast was suggested

## 2021-07-01 NOTE — REVIEW OF SYSTEMS
[Postnasal Drip] : postnasal drip [Hoarseness] : hoarseness [Constipation] : constipation [Nocturia] : nocturia [Impotence] : impotence [Joint Pain] : joint pain [Joint Stiffness] : joint stiffness [Anxiety] : anxiety [Negative] : Heme/Lymph [Heartburn] : heartburn [Skin Rash] : skin rash [Headache] : headache [Fever] : no fever [Chills] : no chills [Fatigue] : no fatigue [Hot Flashes] : no hot flashes [Night Sweats] : no night sweats [Recent Change In Weight] : ~T no recent weight change [Discharge] : no discharge [Pain] : no pain [Redness] : no redness [Dryness] : no dryness [Vision Problems] : no vision problems [Itching] : no itching [Earache] : no earache [Hearing Loss] : no hearing loss [Nosebleeds] : no nosebleeds [Nasal Discharge] : no nasal discharge [Sore Throat] : no sore throat [Chest Pain] : no chest pain [Palpitations] : no palpitations [Claudication] : no  leg claudication [Lower Ext Edema] : no lower extremity edema [Orthopena] : no orthopnea [Paroxysmal Nocturnal Dyspnea] : no paroxysmal nocturnal dyspnea [Shortness Of Breath] : no shortness of breath [Wheezing] : no wheezing [Cough] : no cough [Dyspnea on Exertion] : not dyspnea on exertion [Abdominal Pain] : no abdominal pain [Nausea] : no nausea [Diarrhea] : no diarrhea [Vomiting] : no vomiting [Melena] : no melena [Dysuria] : no dysuria [Hesitancy] : no hesitancy [Incontinence] : no incontinence [Hematuria] : no hematuria [Frequency] : no frequency [Poor Libido] : libido not poor [Muscle Pain] : no muscle pain [Muscle Weakness] : no muscle weakness [Back Pain] : no back pain [Joint Swelling] : no joint swelling [Itching] : no itching [Mole Changes] : no mole changes [Nail Changes] : no nail changes [Hair Changes] : no hair changes [Dizziness] : no dizziness [Fainting] : no fainting [Confusion] : no confusion [Unsteady Walk] : no ataxia [Memory Loss] : no memory loss [Suicidal] : not suicidal [Insomnia] : no insomnia [Depression] : no depression [Easy Bleeding] : no easy bleeding [Easy Bruising] : no easy bruising [Swollen Glands] : no swollen glands [FreeTextEntry9] : knee and shoulders

## 2021-07-01 NOTE — HISTORY OF PRESENT ILLNESS
[FreeTextEntry1] : 66-year-old man comes to the office for a comprehensive physical examination to review his medications and discuss his overall health his main complaints revolve around his arthritic complaints [de-identified] : Comes to the office for a comprehensive physical examination with a history of eczema allergies constipation and hemorrhoids chronic headaches asthma anxiety and depression erectile dysfunction osteoarthritis and lumbar radiculopathy and occasional heartburn denies temperature chills sweats or myalgias he has had occasional headaches denies sinus congestion sore throat cough wheezing pleurisy chest pain shortness of breath exertional dyspnea lightheadedness palpitations dizziness vertigo or syncope he has occasional heartburn denies abdominal pain nausea vomiting or diarrhea he has occasional constipation he denies bright red blood per rectum or black stools his appetite has been good his weight has been stable he has no lower extremity edema he has occasional eczema he has arthritic pains in his knees and shoulders and his anxiety and has been sleeping adequately recently

## 2021-07-09 ENCOUNTER — RX RENEWAL (OUTPATIENT)
Age: 66
End: 2021-07-09

## 2021-07-09 RX ORDER — MONTELUKAST 10 MG/1
10 TABLET, FILM COATED ORAL
Qty: 90 | Refills: 0 | Status: DISCONTINUED | COMMUNITY
Start: 2021-06-29 | End: 2021-07-09

## 2021-09-04 ENCOUNTER — TRANSCRIPTION ENCOUNTER (OUTPATIENT)
Age: 66
End: 2021-09-04

## 2021-09-21 ENCOUNTER — APPOINTMENT (OUTPATIENT)
Dept: INTERNAL MEDICINE | Facility: CLINIC | Age: 66
End: 2021-09-21
Payer: MEDICARE

## 2021-09-21 DIAGNOSIS — N52.9 MALE ERECTILE DYSFUNCTION, UNSPECIFIED: ICD-10-CM

## 2021-09-21 DIAGNOSIS — K64.9 UNSPECIFIED HEMORRHOIDS: ICD-10-CM

## 2021-09-21 PROCEDURE — 99215 OFFICE O/P EST HI 40 MIN: CPT

## 2021-09-24 LAB
COVID-19 NUCLEOCAPSID  GAM ANTIBODY INTERPRETATION: NEGATIVE
COVID-19 SPIKE DOMAIN ANTIBODY INTERPRETATION: POSITIVE
SARS-COV-2 AB SERPL IA-ACNC: 201 U/ML
SARS-COV-2 AB SERPL QL IA: 0.07 INDEX

## 2021-09-25 VITALS
WEIGHT: 171 LBS | OXYGEN SATURATION: 98 % | RESPIRATION RATE: 16 BRPM | BODY MASS INDEX: 22.66 KG/M2 | DIASTOLIC BLOOD PRESSURE: 72 MMHG | HEART RATE: 64 BPM | SYSTOLIC BLOOD PRESSURE: 120 MMHG | HEIGHT: 73 IN | TEMPERATURE: 98.1 F

## 2021-09-25 PROBLEM — K64.9 HEMORRHOIDS: Status: ACTIVE | Noted: 2017-12-07

## 2021-09-25 NOTE — REVIEW OF SYSTEMS
[Postnasal Drip] : postnasal drip [Hoarseness] : hoarseness [Constipation] : constipation [Heartburn] : heartburn [Nocturia] : nocturia [Impotence] : impotence [Joint Pain] : joint pain [Joint Stiffness] : joint stiffness [Skin Rash] : skin rash [Headache] : headache [Anxiety] : anxiety [Negative] : Heme/Lymph [Fever] : no fever [Chills] : no chills [Fatigue] : no fatigue [Hot Flashes] : no hot flashes [Night Sweats] : no night sweats [Recent Change In Weight] : ~T no recent weight change [Discharge] : no discharge [Pain] : no pain [Redness] : no redness [Dryness] : no dryness [Itching] : no itching [Vision Problems] : no vision problems [Earache] : no earache [Hearing Loss] : no hearing loss [Nosebleeds] : no nosebleeds [Nasal Discharge] : no nasal discharge [Sore Throat] : no sore throat [Chest Pain] : no chest pain [Palpitations] : no palpitations [Claudication] : no  leg claudication [Lower Ext Edema] : no lower extremity edema [Orthopena] : no orthopnea [Paroxysmal Nocturnal Dyspnea] : no paroxysmal nocturnal dyspnea [Shortness Of Breath] : no shortness of breath [Wheezing] : no wheezing [Cough] : no cough [Dyspnea on Exertion] : not dyspnea on exertion [Nausea] : no nausea [Abdominal Pain] : no abdominal pain [Diarrhea] : no diarrhea [Vomiting] : no vomiting [Melena] : no melena [Dysuria] : no dysuria [Incontinence] : no incontinence [Hesitancy] : no hesitancy [Hematuria] : no hematuria [Frequency] : no frequency [Poor Libido] : libido not poor [Muscle Pain] : no muscle pain [Muscle Weakness] : no muscle weakness [Back Pain] : back pain [Joint Swelling] : no joint swelling [Nail Changes] : no nail changes [Mole Changes] : no mole changes [Hair Changes] : no hair changes [Dizziness] : no dizziness [Fainting] : no fainting [Confusion] : no confusion [Unsteady Walk] : no ataxia [Memory Loss] : no memory loss [Insomnia] : no insomnia [Suicidal] : not suicidal [Depression] : no depression [Easy Bleeding] : no easy bleeding [Easy Bruising] : no easy bruising [Swollen Glands] : no swollen glands [FreeTextEntry9] : knee and shoulders

## 2021-09-25 NOTE — HISTORY OF PRESENT ILLNESS
[FreeTextEntry1] : 66-year-old man comes to the office for follow-up to review his medications and discuss his overall health patient also wishes to discuss his constipation and up-to-date on the COVID-19 booster [de-identified] : Comes to the office for follow-up with a history of dermatitis allergies constipation gastroesophageal reflux disease erectile dysfunction hemorrhoids osteoarthritis lumbar radiculopathy anxiety and depression and asthma denies temperature chills sweats or myalgias he has occasional postnasal drip and hoarseness gets mild frontal pressure denies significant sinus congestion sore throat cough wheezing pleurisy chest pain shortness of breath exertional dyspnea lightheadedness palpitations dizziness vertigo or syncope has occasional heartburn and constipation but denies abdominal pain nausea vomiting diarrhea bright red blood per rectum or black stools his appetite has been good his weight has been stable he does get up at night to urinate and has difficulty with erections but denies dysuria or gross hematuria he has chronic pains in his knees and shoulders and occasional back he has a chronic dermatitis he denies leg edema bouts with anxiety but claims to be sleeping adequately

## 2021-09-25 NOTE — PHYSICAL EXAM
[No Acute Distress] : no acute distress [Well Nourished] : well nourished [Well Developed] : well developed [Well-Appearing] : well-appearing [Normal Voice/Communication] : normal voice/communication [Normal Sclera/Conjunctiva] : normal sclera/conjunctiva [PERRL] : pupils equal round and reactive to light [EOMI] : extraocular movements intact [Normal Outer Ear/Nose] : the outer ears and nose were normal in appearance [Normal Oropharynx] : the oropharynx was normal [Normal TMs] : both tympanic membranes were normal [No JVD] : no jugular venous distention [No Lymphadenopathy] : no lymphadenopathy [Supple] : supple [Thyroid Normal, No Nodules] : the thyroid was normal and there were no nodules present [No Respiratory Distress] : no respiratory distress  [No Accessory Muscle Use] : no accessory muscle use [Normal Percussion] : the chest was normal to percussion [Clear to Auscultation] : lungs were clear to auscultation bilaterally [Normal Rate] : normal rate  [Regular Rhythm] : with a regular rhythm [Normal S1, S2] : normal S1 and S2 [No Murmur] : no murmur heard [No Carotid Bruits] : no carotid bruits [No Abdominal Bruit] : a ~M bruit was not heard ~T in the abdomen [No Varicosities] : no varicosities [Pedal Pulses Present] : the pedal pulses are present [No Edema] : there was no peripheral edema [No Palpable Aorta] : no palpable aorta [No Extremity Clubbing/Cyanosis] : no extremity clubbing/cyanosis [Declined Breast Exam] : declined breast exam  [Soft] : abdomen soft [Non Tender] : non-tender [Non-distended] : non-distended [No Masses] : no abdominal mass palpated [No HSM] : no HSM [Normal Bowel Sounds] : normal bowel sounds [No Hernias] : no hernias [Declined Rectal Exam] : declined rectal exam [Normal Supraclavicular Nodes] : no supraclavicular lymphadenopathy [Normal Axillary Nodes] : no axillary lymphadenopathy [Normal Posterior Cervical Nodes] : no posterior cervical lymphadenopathy [Normal Anterior Cervical Nodes] : no anterior cervical lymphadenopathy [Normal Inguinal Nodes] : no inguinal lymphadenopathy [Normal Femoral Nodes] : no femoral lymphadenopathy [No CVA Tenderness] : no CVA  tenderness [No Spinal Tenderness] : no spinal tenderness [No Joint Swelling] : no joint swelling [Grossly Normal Strength/Tone] : grossly normal strength/tone [No Rash] : no rash [No Skin Lesions] : no skin lesions [Coordination Grossly Intact] : coordination grossly intact [No Focal Deficits] : no focal deficits [Normal Gait] : normal gait [Speech Grossly Normal] : speech grossly normal [Memory Grossly Normal] : memory grossly normal [Normal Affect] : the affect was normal [Alert and Oriented x3] : oriented to person, place, and time [Normal Mood] : the mood was normal [Normal Insight/Judgement] : insight and judgment were intact [Kyphosis] : no kyphosis [Acne] : no acne [Scoliosis] : no scoliosis

## 2021-09-25 NOTE — ASSESSMENT
[FreeTextEntry1] : Physical exam shows a well-developed man in no acute distress blood pressure 120/72 height 6 feet 1 inch weight 171 pounds BMI 22.56 temperature 98.1 °F heart rate 64 respirations 16 HEENT was unremarkable chest was clear cardiovascular exam was regular with no extra heart sounds or murmurs abdomen was soft extremities showed no edema neurologic exam was nonfocal patient recently following with an orthopedist Dr. Dewitt he is up-to-date with his COVID-19 vaccines influenza vaccines Prevnar 13 and will be considering the Shingrix vaccine is up-to-date with his ophthalmologist and dermatologist and upper endoscopy in 2018 and his last colonoscopy in February 2015 he will call to schedule a follow-up exam patient's frontal headaches not related to blood pressure possibly related to some mild sinus inflammation if worsens will consider a neurologic consultation patient has had minimal episodes of heartburn and he has had minimal discomfort in his back and knees he feels that he is in a better place emotionally taking his sertraline

## 2021-10-19 ENCOUNTER — APPOINTMENT (OUTPATIENT)
Dept: ORTHOPEDIC SURGERY | Facility: CLINIC | Age: 66
End: 2021-10-19
Payer: MEDICARE

## 2021-10-19 PROCEDURE — 73140 X-RAY EXAM OF FINGER(S): CPT | Mod: F1

## 2021-10-19 PROCEDURE — 99214 OFFICE O/P EST MOD 30 MIN: CPT

## 2021-10-19 NOTE — PHYSICAL EXAM
[de-identified] : Patient is WDWN, alert, and in no acute distress. Breathing is unlabored. He is grossly oriented to person, place, and time.\par \par Left Hand (Index Finger):\par Tenderness over the DIP joint, to the ulnar aspect of the finger\par No gross instability of the digit.\par FROM of the index finger at the level of the DIP joint.\par Sensation is normal\par Full ROM of all other digits. [de-identified] : AP, lateral and oblique views of the left hand (index finger) were obtained today and revealed no abnormalities. No acute fracture. No dislocation. Cartilage spaces are maintained.

## 2021-10-19 NOTE — HISTORY OF PRESENT ILLNESS
[FreeTextEntry1] : URMILA ORTEGA is a RHD 66 year male who presents for initial evaluation of left index finger pain which began about 4 to 5 months ago. THe pain is localized to the DIP joint. He states the pain is sharp but the finger does not swell. He notes he is an avid golfer. He states he tapes his thumb for golf and uses his index finger when ripping the tape and is curious as to if this is the cause of his pain.

## 2021-10-19 NOTE — DISCUSSION/SUMMARY
[FreeTextEntry1] : The underlying pathophysiology was reviewed with the patient. XR films were reviewed with the patient. Discussed at length the nature of the patient’s condition. \par \par After review of xrays obtained today, no arthritis is present.\par He was advised of activity modification.\par Topical analgesics as needed.\par Tylenol prn. \par \par All questions answered, understanding verbalized. Patient in agreement with plan of care. Follow up as needed.

## 2021-10-19 NOTE — ADDENDUM
[FreeTextEntry1] : I, Caty Mcmanus wrote this note acting as a scribe for Dr. Aidan Enriquez on Oct 19, 2021.

## 2021-10-19 NOTE — END OF VISIT
[FreeTextEntry3] : All medical record entries made by the Scribe were at my,  Dr. Aidan Enriquez MD., direction and personally dictated by me on 10/19/2021. I have personally reviewed the chart and agree that the record accurately reflects my personal performance of the history, physical exam, assessment and plan.

## 2021-11-04 ENCOUNTER — NON-APPOINTMENT (OUTPATIENT)
Age: 66
End: 2021-11-04

## 2021-11-04 ENCOUNTER — APPOINTMENT (OUTPATIENT)
Dept: INTERNAL MEDICINE | Facility: CLINIC | Age: 66
End: 2021-11-04
Payer: MEDICARE

## 2021-11-04 VITALS
OXYGEN SATURATION: 100 % | TEMPERATURE: 97.2 F | BODY MASS INDEX: 22.26 KG/M2 | WEIGHT: 168 LBS | DIASTOLIC BLOOD PRESSURE: 64 MMHG | SYSTOLIC BLOOD PRESSURE: 112 MMHG | HEIGHT: 73 IN | RESPIRATION RATE: 16 BRPM | HEART RATE: 68 BPM

## 2021-11-04 DIAGNOSIS — R00.2 PALPITATIONS: ICD-10-CM

## 2021-11-04 DIAGNOSIS — R53.83 OTHER FATIGUE: ICD-10-CM

## 2021-11-04 PROCEDURE — 99215 OFFICE O/P EST HI 40 MIN: CPT | Mod: 25

## 2021-11-04 PROCEDURE — 93000 ELECTROCARDIOGRAM COMPLETE: CPT | Mod: 59

## 2021-11-04 RX ORDER — PREDNISONE 10 MG/1
10 TABLET ORAL TWICE DAILY
Qty: 60 | Refills: 0 | Status: DISCONTINUED | COMMUNITY
Start: 2021-09-21 | End: 2021-11-04

## 2021-11-09 PROBLEM — R00.2 PALPITATIONS: Status: ACTIVE | Noted: 2017-12-07

## 2021-11-09 PROBLEM — R53.83 FATIGUE: Status: ACTIVE | Noted: 2021-11-09

## 2021-11-09 NOTE — ASSESSMENT
[FreeTextEntry1] : Physical examination reveals a well-developed man in no acute distress blood pressure 112/64 height of 6 feet 1 inches weight 168 pounds BMI 22.17 temperature of 97.2 °F heart rate of 68 respiratory rate of 16 HEENT was unremarkable chest was clear cardiovascular exam was regular with no extra heart sounds or murmurs abdomen was soft and nontender extremities showed no clubbing cyanosis or edema neurologic exam was nonfocal patient is up-to-date with his primary Covid vaccination as well as his booster always remains current with his influenza vaccine he has had the Prevnar 13 and will consider the Shingrix vaccines an EKG was performed to due to his feeling of malaise which showed a normal sinus rhythm at 61 with no acute ST-T wave changes within normal limits patient recently saw her Dr. Enriquez hand orthopedist concerning his left index finger and follows with Dr. Dewitt concerning his other orthopedic issues patient had complete blood test in June 2021 patient feels that he is doing well emotionally on sertraline 50 mg a day he has had minimal episodes of heartburn and has been moving his bowels adequately with dietary manipulations he has had no episodes of asthma he does have allergies requiring antihistamines at times

## 2021-11-09 NOTE — HISTORY OF PRESENT ILLNESS
[FreeTextEntry1] : 66-year-old man comes to the office for follow-up to review his medications and discuss his overall health recent issues with fatigue and vague discomforts in his muscles [de-identified] : Comes to the office for follow-up with a history of constipation asthma dermatitis gastroesophageal reflux disease anxiety and depression allergies osteoarthritis of the knee and lumbar radiculopathy he denies temperature chills sweats or myalgias he has had no headache sinus congestion sore throat cough wheezing pleurisy chest pain he has hoarseness at times denies shortness of breath at rest or exertional dyspnea he has had no lightheadedness dizziness vertigo or syncope patient does experience occasional heartburn and constipation he has had no abdominal pain nausea vomiting diarrhea bright red blood per rectum or black stools his appetite has been good his weight has been stable he gets up occasionally at night to urinate but denies dysuria or gross hematuria he has chronic pain in his back knees and shoulders has occasional dermatitis he is troubled by anxiety but has been sleeping adequately recently

## 2021-11-09 NOTE — REVIEW OF SYSTEMS
[Postnasal Drip] : postnasal drip [Hoarseness] : hoarseness [Constipation] : constipation [Heartburn] : heartburn [Nocturia] : nocturia [Impotence] : impotence [Joint Pain] : joint pain [Joint Stiffness] : joint stiffness [Back Pain] : back pain [Skin Rash] : skin rash [Headache] : headache [Anxiety] : anxiety [Negative] : Heme/Lymph [Fever] : no fever [Chills] : no chills [Fatigue] : no fatigue [Hot Flashes] : no hot flashes [Night Sweats] : no night sweats [Recent Change In Weight] : ~T no recent weight change [Discharge] : no discharge [Pain] : no pain [Redness] : no redness [Dryness] : no dryness [Vision Problems] : no vision problems [Itching] : no itching [Earache] : no earache [Hearing Loss] : no hearing loss [Nosebleeds] : no nosebleeds [Nasal Discharge] : no nasal discharge [Sore Throat] : no sore throat [Chest Pain] : no chest pain [Palpitations] : no palpitations [Claudication] : no  leg claudication [Lower Ext Edema] : no lower extremity edema [Orthopena] : no orthopnea [Paroxysmal Nocturnal Dyspnea] : no paroxysmal nocturnal dyspnea [Shortness Of Breath] : no shortness of breath [Wheezing] : no wheezing [Cough] : no cough [Dyspnea on Exertion] : not dyspnea on exertion [Abdominal Pain] : no abdominal pain [Nausea] : no nausea [Diarrhea] : no diarrhea [Vomiting] : no vomiting [Melena] : no melena [Dysuria] : no dysuria [Incontinence] : no incontinence [Hesitancy] : no hesitancy [Hematuria] : no hematuria [Frequency] : no frequency [Poor Libido] : libido not poor [Muscle Pain] : no muscle pain [Muscle Weakness] : no muscle weakness [Joint Swelling] : no joint swelling [Itching] : no itching [Mole Changes] : no mole changes [Nail Changes] : no nail changes [Hair Changes] : no hair changes [Dizziness] : no dizziness [Fainting] : no fainting [Confusion] : no confusion [Unsteady Walk] : no ataxia [Memory Loss] : no memory loss [Suicidal] : not suicidal [Insomnia] : no insomnia [Depression] : no depression [Easy Bleeding] : no easy bleeding [Easy Bruising] : no easy bruising [Swollen Glands] : no swollen glands [FreeTextEntry9] : knee and shoulders

## 2021-11-09 NOTE — HEALTH RISK ASSESSMENT
[Yes] : Yes [No falls in past year] : Patient reported no falls in the past year [0] : 2) Feeling down, depressed, or hopeless: Not at all (0) [PHQ-2 Negative - No further assessment needed] : PHQ-2 Negative - No further assessment needed [] : No [[id number]] : 0

## 2021-11-30 ENCOUNTER — APPOINTMENT (OUTPATIENT)
Dept: INTERNAL MEDICINE | Facility: CLINIC | Age: 66
End: 2021-11-30
Payer: MEDICARE

## 2021-11-30 DIAGNOSIS — M51.36 OTHER INTERVERTEBRAL DISC DEGENERATION, LUMBAR REGION: ICD-10-CM

## 2021-11-30 DIAGNOSIS — M23.92 UNSPECIFIED INTERNAL DERANGEMENT OF LEFT KNEE: ICD-10-CM

## 2021-11-30 DIAGNOSIS — M19.042 PRIMARY OSTEOARTHRITIS, LEFT HAND: ICD-10-CM

## 2021-11-30 PROCEDURE — 99443: CPT | Mod: 95

## 2021-12-04 PROBLEM — M51.36 DISC DEGENERATION, LUMBAR: Status: ACTIVE | Noted: 2017-01-30

## 2021-12-04 PROBLEM — M19.042 ARTHRITIS OF FINGER OF LEFT HAND: Status: ACTIVE | Noted: 2021-10-19

## 2021-12-04 PROBLEM — M23.92 DERANGEMENT, KNEE INTERNAL, LEFT: Status: ACTIVE | Noted: 2018-11-05

## 2021-12-06 ENCOUNTER — NON-APPOINTMENT (OUTPATIENT)
Age: 66
End: 2021-12-06

## 2022-02-14 ENCOUNTER — RX RENEWAL (OUTPATIENT)
Age: 67
End: 2022-02-14

## 2022-05-13 ENCOUNTER — RX RENEWAL (OUTPATIENT)
Age: 67
End: 2022-05-13

## 2022-06-15 ENCOUNTER — APPOINTMENT (OUTPATIENT)
Dept: ORTHOPEDIC SURGERY | Facility: CLINIC | Age: 67
End: 2022-06-15

## 2022-06-19 ENCOUNTER — NON-APPOINTMENT (OUTPATIENT)
Age: 67
End: 2022-06-19

## 2022-07-06 ENCOUNTER — APPOINTMENT (OUTPATIENT)
Dept: ORTHOPEDIC SURGERY | Facility: CLINIC | Age: 67
End: 2022-07-06

## 2022-07-06 VITALS — WEIGHT: 163 LBS | HEIGHT: 73 IN | BODY MASS INDEX: 21.6 KG/M2

## 2022-07-06 DIAGNOSIS — M25.552 PAIN IN LEFT HIP: ICD-10-CM

## 2022-07-06 DIAGNOSIS — M17.11 UNILATERAL PRIMARY OSTEOARTHRITIS, RIGHT KNEE: ICD-10-CM

## 2022-07-06 PROCEDURE — 99213 OFFICE O/P EST LOW 20 MIN: CPT

## 2022-07-06 NOTE — PHYSICAL EXAM
[de-identified] : An AP of the pelvis and lateral of his left hip from elsewhere were reviewed femoral head is round joint space maintained both hips.  There is no major abnormality seen by x-ray in either hip.\par \par His MRI that was done does show some degenerative changes of the labrum but is not like a large detached tear.  Changes are minimal and  the hip joint cartilage appears to be within normal limits. [FreeTextEntry2] : He continues to do well with his knee on the right.  Both of his knees have full extension and can flex to 130 degrees is stability in both medial lateral and anterior posterior plane are normal there is no effusion and there is no evidence of a Baker's cyst.  He does have some degenerative changes in his right knee as before.\par \par He has been having the discomfort in his left hip.  He feels that this is making it so he cannot play golf.  On his examination he continues to have a full range of motion and symmetric motion of both the right and left hips. with flexion of 135 full extension abduction to 75 adduction to 30 external rotation 75 internal rotation to 20.  At this time there is minimal groin discomfort when going through his range of motion he says he does have the discomfort however with golf. \par \par He no longer has a significant amount of pain over his greater trochanter.  His greater trochanteric bursitis is better. \par \par

## 2022-07-06 NOTE — HISTORY OF PRESENT ILLNESS
[de-identified] : Mr. URMILA ORTEGA is a 67 year male who returns to office complaining of left hip pain x 1 year with insidious onset.\par Denies specific injury, trauma, or fall to the hip. No previous hip pain.\par He noticed this pain around the time he started playing a lot of golf to begin last year.\par He notes aggravating pain also when going from a seated to standing position.\par Pain is described as a dull/achy pain that is over the lateral aspect of the hip and in the groin as well.\par Pain does not radiate or have any associated numbness/tingling or weakness down his legs.\par He has not tried conservative measures to help with the pain.\par Also of note, he has sensitivity with his stomach when taking NSAIDs.

## 2022-07-06 NOTE — DISCUSSION/SUMMARY
[de-identified] : This patient is coming in asked for advice he saw a arthroscopic surgery to the [facility] for special surgery is says he cannot do an arthroscopy and to see if he has to do more to the labrum.  In my opinion the patient has some arthritis in his right knee and knee more pain in his left hip may cause increased pain on the right.  I do not feel with the findings that he has MRI and x-ray that I would strongly prefer a local injection with the local and cortisone to see whether or not the pain is coming from his hip.  He may also be Getting pain from several of the tendons and the muscles around the hip.  At this time he would like to just stay on conservative measures.  He was happy to hear my opinion but he will decide on his own what he would like to do.

## 2022-07-19 ENCOUNTER — APPOINTMENT (OUTPATIENT)
Dept: INTERNAL MEDICINE | Facility: CLINIC | Age: 67
End: 2022-07-19

## 2022-07-19 DIAGNOSIS — L30.9 DERMATITIS, UNSPECIFIED: ICD-10-CM

## 2022-07-19 DIAGNOSIS — G62.9 POLYNEUROPATHY, UNSPECIFIED: ICD-10-CM

## 2022-07-19 DIAGNOSIS — K21.9 GASTRO-ESOPHAGEAL REFLUX DISEASE W/OUT ESOPHAGITIS: ICD-10-CM

## 2022-07-19 DIAGNOSIS — K59.00 CONSTIPATION, UNSPECIFIED: ICD-10-CM

## 2022-07-19 DIAGNOSIS — R51.9 HEADACHE, UNSPECIFIED: ICD-10-CM

## 2022-07-19 DIAGNOSIS — J45.909 UNSPECIFIED ASTHMA, UNCOMPLICATED: ICD-10-CM

## 2022-07-19 DIAGNOSIS — M54.16 RADICULOPATHY, LUMBAR REGION: ICD-10-CM

## 2022-07-19 DIAGNOSIS — G44.86 CERVICOGENIC HEADACHE: ICD-10-CM

## 2022-07-19 DIAGNOSIS — G89.29 HEADACHE, UNSPECIFIED: ICD-10-CM

## 2022-07-19 DIAGNOSIS — Z00.00 ENCOUNTER FOR GENERAL ADULT MEDICAL EXAMINATION W/OUT ABNORMAL FINDINGS: ICD-10-CM

## 2022-07-19 DIAGNOSIS — M17.10 UNILATERAL PRIMARY OSTEOARTHRITIS, UNSPECIFIED KNEE: ICD-10-CM

## 2022-07-19 DIAGNOSIS — T78.40XA ALLERGY, UNSPECIFIED, INITIAL ENCOUNTER: ICD-10-CM

## 2022-07-19 PROCEDURE — G0439: CPT

## 2022-07-24 VITALS
WEIGHT: 163 LBS | DIASTOLIC BLOOD PRESSURE: 64 MMHG | TEMPERATURE: 97.2 F | HEART RATE: 68 BPM | BODY MASS INDEX: 21.6 KG/M2 | RESPIRATION RATE: 16 BRPM | OXYGEN SATURATION: 100 % | SYSTOLIC BLOOD PRESSURE: 112 MMHG | HEIGHT: 73 IN

## 2022-07-24 PROBLEM — T78.40XA ALLERGIES: Status: ACTIVE | Noted: 2020-04-19

## 2022-07-24 PROBLEM — J45.909 ASTHMA: Status: ACTIVE | Noted: 2020-04-17

## 2022-07-24 PROBLEM — R51.9 CHRONIC HEADACHES: Status: ACTIVE | Noted: 2017-10-20

## 2022-07-24 NOTE — REVIEW OF SYSTEMS
[Postnasal Drip] : postnasal drip [Hoarseness] : hoarseness [Constipation] : constipation [Heartburn] : heartburn [Nocturia] : nocturia [Impotence] : impotence [Joint Pain] : joint pain [Joint Stiffness] : joint stiffness [Back Pain] : back pain [Skin Rash] : skin rash [Headache] : headache [Anxiety] : anxiety [Negative] : Heme/Lymph [Fever] : no fever [Chills] : no chills [Fatigue] : no fatigue [Hot Flashes] : no hot flashes [Night Sweats] : no night sweats [Recent Change In Weight] : ~T no recent weight change [Discharge] : no discharge [Pain] : no pain [Redness] : no redness [Dryness] : no dryness [Vision Problems] : no vision problems [Itching] : no itching [Earache] : no earache [Hearing Loss] : no hearing loss [Nosebleeds] : no nosebleeds [Nasal Discharge] : no nasal discharge [Sore Throat] : no sore throat [Chest Pain] : no chest pain [Palpitations] : no palpitations [Claudication] : no  leg claudication [Orthopena] : no orthopnea [Lower Ext Edema] : no lower extremity edema [Paroxysmal Nocturnal Dyspnea] : no paroxysmal nocturnal dyspnea [Shortness Of Breath] : no shortness of breath [Wheezing] : no wheezing [Cough] : no cough [Dyspnea on Exertion] : not dyspnea on exertion [Nausea] : no nausea [Abdominal Pain] : no abdominal pain [Diarrhea] : no diarrhea [Vomiting] : no vomiting [Melena] : no melena [Dysuria] : no dysuria [Incontinence] : no incontinence [Hesitancy] : no hesitancy [Hematuria] : no hematuria [Frequency] : no frequency [Poor Libido] : libido not poor [Muscle Pain] : no muscle pain [Muscle Weakness] : no muscle weakness [Joint Swelling] : no joint swelling [Itching] : no itching [Mole Changes] : no mole changes [Nail Changes] : no nail changes [Hair Changes] : no hair changes [Dizziness] : no dizziness [Fainting] : no fainting [Confusion] : no confusion [Memory Loss] : no memory loss [Unsteady Walk] : no ataxia [Suicidal] : not suicidal [Insomnia] : no insomnia [Depression] : no depression [Easy Bleeding] : no easy bleeding [Swollen Glands] : no swollen glands [Easy Bruising] : no easy bruising [FreeTextEntry9] : knee and shoulders

## 2022-07-24 NOTE — HISTORY OF PRESENT ILLNESS
[FreeTextEntry1] : 67-year-old man comes to the office for a comprehensive physical examination to review his medications and discuss his overall health chief issues revolve around his arthritic pains in his knees shoulders and back [de-identified] : Comes to the office for a comprehensive physical examination with a history of anxiety and depression cervicogenic headaches GERD constipation lumbar radiculopathy neuropathy dermatitis allergies chronic headaches asthma osteoarthritis of the knee patient's review of systems is significant for postnasal drip and hoarseness heartburn and constipation nocturia erectile dysfunction chronic pain in his back knees and shoulders chronic dermatitis and episodes of anxiety remaining review of systems is noncontributory

## 2022-07-24 NOTE — HEALTH RISK ASSESSMENT
[Never] : Never [Yes] : Yes [No falls in past year] : Patient reported no falls in the past year [0] : 1) Little interest or pleasure doing things: Not at all (0) [1] : 2) Feeling down, depressed, or hopeless for several days (1) [PHQ-2 Negative - No further assessment needed] : PHQ-2 Negative - No further assessment needed [[id number]] : 1

## 2022-07-24 NOTE — PHYSICAL EXAM
[No Acute Distress] : no acute distress [Well Nourished] : well nourished [Well Developed] : well developed [Well-Appearing] : well-appearing [Normal Voice/Communication] : normal voice/communication [Normal Sclera/Conjunctiva] : normal sclera/conjunctiva [PERRL] : pupils equal round and reactive to light [EOMI] : extraocular movements intact [Normal Outer Ear/Nose] : the outer ears and nose were normal in appearance [Normal Oropharynx] : the oropharynx was normal [Normal TMs] : both tympanic membranes were normal [No JVD] : no jugular venous distention [No Lymphadenopathy] : no lymphadenopathy [Supple] : supple [Thyroid Normal, No Nodules] : the thyroid was normal and there were no nodules present [No Respiratory Distress] : no respiratory distress  [No Accessory Muscle Use] : no accessory muscle use [Clear to Auscultation] : lungs were clear to auscultation bilaterally [Normal Percussion] : the chest was normal to percussion [Normal Rate] : normal rate  [Regular Rhythm] : with a regular rhythm [Normal S1, S2] : normal S1 and S2 [No Murmur] : no murmur heard [No Carotid Bruits] : no carotid bruits [No Abdominal Bruit] : a ~M bruit was not heard ~T in the abdomen [No Varicosities] : no varicosities [Pedal Pulses Present] : the pedal pulses are present [No Edema] : there was no peripheral edema [No Palpable Aorta] : no palpable aorta [No Extremity Clubbing/Cyanosis] : no extremity clubbing/cyanosis [Declined Breast Exam] : declined breast exam  [Non Tender] : non-tender [Soft] : abdomen soft [Non-distended] : non-distended [No Masses] : no abdominal mass palpated [No HSM] : no HSM [Normal Bowel Sounds] : normal bowel sounds [No Hernias] : no hernias [Declined Rectal Exam] : declined rectal exam [Normal Supraclavicular Nodes] : no supraclavicular lymphadenopathy [Normal Axillary Nodes] : no axillary lymphadenopathy [Normal Posterior Cervical Nodes] : no posterior cervical lymphadenopathy [Normal Anterior Cervical Nodes] : no anterior cervical lymphadenopathy [Normal Inguinal Nodes] : no inguinal lymphadenopathy [Normal Femoral Nodes] : no femoral lymphadenopathy [No CVA Tenderness] : no CVA  tenderness [No Spinal Tenderness] : no spinal tenderness [Kyphosis] : no kyphosis [Scoliosis] : no scoliosis [No Joint Swelling] : no joint swelling [Grossly Normal Strength/Tone] : grossly normal strength/tone [No Rash] : no rash [No Skin Lesions] : no skin lesions [Acne] : no acne [Coordination Grossly Intact] : coordination grossly intact [No Focal Deficits] : no focal deficits [Normal Gait] : normal gait [Speech Grossly Normal] : speech grossly normal [Memory Grossly Normal] : memory grossly normal [Normal Affect] : the affect was normal [Alert and Oriented x3] : oriented to person, place, and time [Normal Mood] : the mood was normal [Normal Insight/Judgement] : insight and judgment were intact

## 2022-07-24 NOTE — ASSESSMENT
[FreeTextEntry1] : Physical examination reveals a well-developed man in no acute distress blood pressure 112/64 height 6 feet 1 inch weight 163 pounds BMI 21.51 temperature 97.2 °F heart rate 68 respiratory rate of 16 oxygen saturation on room air was 100% HEENT was unremarkable chest was clear cardiovascular exam was regular with no extra heart sounds or murmurs abdomen was soft extremities showed no clubbing cyanosis or edema neurologic exam was nonfocal patient follows closely with his orthopedist Dr. Dewitt's last EKG in November 2021 patient has received his primary COVID vaccines his influenza vaccine.  Prevnar 13 he is due for his Shingrix vaccine patient's last blood test was at the end of 2021 his bowels have been moving well with the addition of dietary fiber and liquids his anxiety and depression are doing well on fluoxetine and sertraline his allergies have responded to montelukast he has had minimal episodes of heartburn recently and takes Celebrex for his arthritic complaints

## 2022-07-26 DIAGNOSIS — M25.50 PAIN IN UNSPECIFIED JOINT: ICD-10-CM

## 2022-07-30 ENCOUNTER — LABORATORY RESULT (OUTPATIENT)
Age: 67
End: 2022-07-30

## 2022-08-01 ENCOUNTER — APPOINTMENT (OUTPATIENT)
Dept: ORTHOPEDIC SURGERY | Facility: CLINIC | Age: 67
End: 2022-08-01

## 2022-08-01 VITALS — HEIGHT: 73 IN | WEIGHT: 165 LBS | BODY MASS INDEX: 21.87 KG/M2

## 2022-08-01 DIAGNOSIS — M76.892 OTHER SPECIFIED ENTHESOPATHIES OF LEFT LOWER LIMB, EXCLUDING FOOT: ICD-10-CM

## 2022-08-01 PROCEDURE — 99214 OFFICE O/P EST MOD 30 MIN: CPT

## 2022-08-01 NOTE — DISCUSSION/SUMMARY
[de-identified] : The underlying pathophysiology was reviewed in great detail with the patient as well as the various treatment options, including ice, analgesics, NSAIDs, Voltaren Gel, Physical therapy, steroid injections.\par \par Activity modifications and restrictions were discussed. I advised avoiding deep bending, squatting and high intensity activity.\par \par A home exercise sheet was given and discussed with the patient to follow. \par \par FU in 6 weeks.		 \par \par All questions were answered, all alternatives discussed and the patient is in complete agreement with that plan. Follow-up appointment as instructed. Any issues and the patient will call or come in sooner.

## 2022-08-01 NOTE — HISTORY OF PRESENT ILLNESS
[de-identified] : URMILA ORTEGA is a 67 year old male who presents for initial evaluation of left hip pain which started about 14 months ago, and has been worsening for 6 months. He had x-rays done in February 2022, and MRI in December 2021 done in [place]; he brought CD images. Patient locates the pain to the anterior lateral aspect of left hip, and occasional groin pain. Bending and swinging a gulf club worsens the pain. He had a bursa injection last year in October which provided relief. He did PT, which did not help much. \par \par Patient believes he has a torn labrum, and is here for second opinion. \par \par \par

## 2022-08-01 NOTE — ADDENDUM
[FreeTextEntry1] : I, Shelton Light, acted solely as a scribe for Dr. Srinath Dennis on this date 08/01/2022.

## 2022-08-01 NOTE — PHYSICAL EXAM
[Normal RLE] : Right Lower Extremity: No scars, rashes, lesions, ulcers, skin intact [Normal LLE] : Left Lower Extremity: No scars, rashes, lesions, ulcers, skin intact [Normal Touch] : sensation intact for touch [Normal] : Oriented to person, place, and time, insight and judgement were intact and the affect was normal [de-identified] : Left Lower Extremity\par o Hip :\par ¦ Inspection/Palpation : tenderness to palpation over the anterolateral hip flexor tendons, no swelling, no deformity\par ¦ Range of Motion : full and painless in all planes, no crepitus\par ¦ Stability : joint stability intact\par ¦ Strength : hip flexion 5/5\par ¦ Tests and Signs : all tests for stability normal, Hip scour (-), FADER (-), BRITTANY (-), SLR (-)\par o Muscle Tone : tone normal\par o Muscle Bulk : normal muscle bulk present\par o Skin : no erythema, no ecchymosis\par o Sensation : sensation to light touch intact\par o Vascular Exam : no edema, no cyanosis, dorsalis pedis artery pulse 2+, posterior tibial artery pulse 2+\par  [de-identified] : \par Outside XR obtained on 02/14/2022 reviewed today:\par o Left Hip and pelvis : AP and lateral views were obtained, there are no soft tissue abnormalities, no fractures, alignment is normal, normal bone density, mild osteoarthritis, subchondral cyst medial aspect, osteophyte formation, mild CAM lesion\par \par Outside MRI of the left hip obtained on 12/02/21 in [place] which revealed partial thickness tear of the superior anterior labrum

## 2022-08-03 DIAGNOSIS — E87.5 HYPERKALEMIA: ICD-10-CM

## 2022-08-19 ENCOUNTER — APPOINTMENT (OUTPATIENT)
Dept: RHEUMATOLOGY | Facility: CLINIC | Age: 67
End: 2022-08-19

## 2022-08-31 DIAGNOSIS — Z23 ENCOUNTER FOR IMMUNIZATION: ICD-10-CM

## 2022-09-06 ENCOUNTER — APPOINTMENT (OUTPATIENT)
Dept: PULMONOLOGY | Facility: CLINIC | Age: 67
End: 2022-09-06

## 2022-09-06 VITALS
OXYGEN SATURATION: 100 % | HEIGHT: 73 IN | SYSTOLIC BLOOD PRESSURE: 111 MMHG | TEMPERATURE: 97.7 F | DIASTOLIC BLOOD PRESSURE: 62 MMHG | WEIGHT: 167 LBS | BODY MASS INDEX: 22.13 KG/M2 | HEART RATE: 102 BPM | RESPIRATION RATE: 12 BRPM

## 2022-09-06 DIAGNOSIS — J92.9 PLEURAL PLAQUE W/OUT ASBESTOS: ICD-10-CM

## 2022-09-06 PROCEDURE — 99203 OFFICE O/P NEW LOW 30 MIN: CPT

## 2022-09-06 RX ORDER — SERTRALINE HYDROCHLORIDE 50 MG/1
50 TABLET, FILM COATED ORAL DAILY
Qty: 90 | Refills: 1 | Status: DISCONTINUED | COMMUNITY
Start: 2021-06-29 | End: 2022-09-06

## 2022-09-06 RX ORDER — CELECOXIB 100 MG/1
100 CAPSULE ORAL
Qty: 10 | Refills: 0 | Status: DISCONTINUED | COMMUNITY
Start: 2022-07-19 | End: 2022-09-06

## 2022-09-06 NOTE — ASSESSMENT
[FreeTextEntry1] : Most likely patient has benign pleural thickening.  Chest x-ray with apical lordotic ordered.

## 2022-09-08 ENCOUNTER — APPOINTMENT (OUTPATIENT)
Dept: RADIOLOGY | Facility: CLINIC | Age: 67
End: 2022-09-08

## 2022-09-08 ENCOUNTER — OUTPATIENT (OUTPATIENT)
Dept: OUTPATIENT SERVICES | Facility: HOSPITAL | Age: 67
LOS: 1 days | End: 2022-09-08
Payer: MEDICARE

## 2022-09-08 DIAGNOSIS — G44.86 CERVICOGENIC HEADACHE: ICD-10-CM

## 2022-09-08 DIAGNOSIS — J92.9 PLEURAL PLAQUE WITHOUT ASBESTOS: ICD-10-CM

## 2022-09-08 PROCEDURE — 71045 X-RAY EXAM CHEST 1 VIEW: CPT | Mod: 26

## 2022-09-08 PROCEDURE — 71045 X-RAY EXAM CHEST 1 VIEW: CPT

## 2022-10-01 LAB
25(OH)D3 SERPL-MCNC: 69.5 NG/ML
ALBUMIN SERPL ELPH-MCNC: 4.9 G/DL
ALP BLD-CCNC: 61 U/L
ALT SERPL-CCNC: 18 U/L
ANA SER IF-ACNC: NEGATIVE
ANION GAP SERPL CALC-SCNC: 12 MMOL/L
ANION GAP SERPL CALC-SCNC: 9 MMOL/L
APPEARANCE: ABNORMAL
AST SERPL-CCNC: 22 U/L
BASOPHILS # BLD AUTO: 0.03 K/UL
BASOPHILS NFR BLD AUTO: 0.6 %
BILIRUB SERPL-MCNC: 0.8 MG/DL
BILIRUBIN URINE: NEGATIVE
BLOOD URINE: NEGATIVE
BUN SERPL-MCNC: 10 MG/DL
BUN SERPL-MCNC: 13 MG/DL
CALCIUM SERPL-MCNC: 10.3 MG/DL
CALCIUM SERPL-MCNC: 9.4 MG/DL
CCP AB SER IA-ACNC: <8 UNITS
CHLORIDE SERPL-SCNC: 104 MMOL/L
CHLORIDE SERPL-SCNC: 105 MMOL/L
CHOLEST SERPL-MCNC: 248 MG/DL
CO2 SERPL-SCNC: 25 MMOL/L
CO2 SERPL-SCNC: 28 MMOL/L
COLOR: NORMAL
COVID-19 NUCLEOCAPSID  GAM ANTIBODY INTERPRETATION: NEGATIVE
COVID-19 SPIKE DOMAIN ANTIBODY INTERPRETATION: POSITIVE
CREAT SERPL-MCNC: 0.8 MG/DL
CREAT SERPL-MCNC: 0.88 MG/DL
CRP SERPL HS-MCNC: 0.5 MG/L
EGFR: 95 ML/MIN/1.73M2
EGFR: 98 ML/MIN/1.73M2
EOSINOPHIL # BLD AUTO: 0.16 K/UL
EOSINOPHIL NFR BLD AUTO: 3.1 %
ERYTHROCYTE [SEDIMENTATION RATE] IN BLOOD BY WESTERGREN METHOD: 7 MM/HR
ESTIMATED AVERAGE GLUCOSE: 111 MG/DL
GLUCOSE BS SERPL-MCNC: 85 MG/DL
GLUCOSE QUALITATIVE U: NEGATIVE
GLUCOSE SERPL-MCNC: 125 MG/DL
GLUCOSE SERPL-MCNC: 95 MG/DL
HBA1C MFR BLD HPLC: 5.5 %
HCT VFR BLD CALC: 46.8 %
HDLC SERPL-MCNC: 79 MG/DL
HGB BLD-MCNC: 15.2 G/DL
IMM GRANULOCYTES NFR BLD AUTO: 0.2 %
KETONES URINE: NEGATIVE
LDLC SERPL CALC-MCNC: 150 MG/DL
LEUKOCYTE ESTERASE URINE: NEGATIVE
LYMPHOCYTES # BLD AUTO: 1.19 K/UL
LYMPHOCYTES NFR BLD AUTO: 22.9 %
MAN DIFF?: NORMAL
MCHC RBC-ENTMCNC: 32.1 PG
MCHC RBC-ENTMCNC: 32.5 GM/DL
MCV RBC AUTO: 98.7 FL
MONOCYTES # BLD AUTO: 0.57 K/UL
MONOCYTES NFR BLD AUTO: 11 %
NEUTROPHILS # BLD AUTO: 3.23 K/UL
NEUTROPHILS NFR BLD AUTO: 62.2 %
NITRITE URINE: NEGATIVE
NONHDLC SERPL-MCNC: 169 MG/DL
PH URINE: 8
PLATELET # BLD AUTO: 245 K/UL
POTASSIUM SERPL-SCNC: 4.3 MMOL/L
POTASSIUM SERPL-SCNC: 5.8 MMOL/L
PROT SERPL-MCNC: 7 G/DL
PROTEIN URINE: NORMAL
PSA FREE FLD-MCNC: 39 %
PSA FREE SERPL-MCNC: 0.41 NG/ML
PSA SERPL-MCNC: 1.05 NG/ML
RBC # BLD: 4.74 M/UL
RBC # FLD: 13.3 %
RF+CCP IGG SER-IMP: NEGATIVE
RHEUMATOID FACT SER QL: <10 IU/ML
SARS-COV-2 AB SERPL IA-ACNC: >250 U/ML
SARS-COV-2 AB SERPL QL IA: 0.06 INDEX
SODIUM SERPL-SCNC: 141 MMOL/L
SODIUM SERPL-SCNC: 143 MMOL/L
SPECIFIC GRAVITY URINE: 1.01
T4 FREE SERPL-MCNC: 1.1 NG/DL
TESTOST FREE SERPL-MCNC: 3.7 PG/ML
TESTOST FREE SERPL-MCNC: <0 PG/ML
TESTOST SERPL-MCNC: 297 NG/DL
TESTOST SERPL-MCNC: 458 NG/DL
TRIGL SERPL-MCNC: 97 MG/DL
TSH SERPL-ACNC: 2.65 UIU/ML
UROBILINOGEN URINE: NORMAL
VIT B12 SERPL-MCNC: 617 PG/ML
WBC # FLD AUTO: 5.19 K/UL

## 2022-10-07 DIAGNOSIS — J32.9 CHRONIC SINUSITIS, UNSPECIFIED: ICD-10-CM

## 2022-10-13 ENCOUNTER — APPOINTMENT (OUTPATIENT)
Dept: INTERNAL MEDICINE | Facility: CLINIC | Age: 67
End: 2022-10-13

## 2023-05-08 ENCOUNTER — APPOINTMENT (OUTPATIENT)
Dept: INTERNAL MEDICINE | Facility: CLINIC | Age: 68
End: 2023-05-08

## 2023-05-11 ENCOUNTER — APPOINTMENT (OUTPATIENT)
Dept: FAMILY MEDICINE | Facility: CLINIC | Age: 68
End: 2023-05-11
Payer: MEDICARE

## 2023-05-11 VITALS
WEIGHT: 160 LBS | HEART RATE: 77 BPM | RESPIRATION RATE: 16 BRPM | BODY MASS INDEX: 21.2 KG/M2 | DIASTOLIC BLOOD PRESSURE: 76 MMHG | OXYGEN SATURATION: 98 % | TEMPERATURE: 97.8 F | HEIGHT: 73 IN | SYSTOLIC BLOOD PRESSURE: 120 MMHG

## 2023-05-11 DIAGNOSIS — Z76.89 PERSONS ENCOUNTERING HEALTH SERVICES IN OTHER SPECIFIED CIRCUMSTANCES: ICD-10-CM

## 2023-05-11 DIAGNOSIS — F41.9 ANXIETY DISORDER, UNSPECIFIED: ICD-10-CM

## 2023-05-11 DIAGNOSIS — F32.A ANXIETY DISORDER, UNSPECIFIED: ICD-10-CM

## 2023-05-11 DIAGNOSIS — Z83.438 FAMILY HISTORY OF OTHER DISORDER OF LIPOPROTEIN METABOLISM AND OTHER LIPIDEMIA: ICD-10-CM

## 2023-05-11 PROCEDURE — 99214 OFFICE O/P EST MOD 30 MIN: CPT

## 2023-05-11 RX ORDER — MONTELUKAST SODIUM 5 MG/1
5 TABLET, CHEWABLE ORAL DAILY
Qty: 60 | Refills: 5 | Status: COMPLETED | COMMUNITY
Start: 2021-07-09 | End: 2023-05-11

## 2023-05-11 RX ORDER — FLUOXETINE HYDROCHLORIDE 20 MG/1
20 CAPSULE ORAL
Qty: 90 | Refills: 1 | Status: COMPLETED | COMMUNITY
Start: 2022-01-04 | End: 2023-05-11

## 2023-05-11 RX ORDER — ERYTHROMYCIN 5 MG/G
5 OINTMENT OPHTHALMIC
Qty: 4 | Refills: 0 | Status: DISCONTINUED | COMMUNITY
Start: 2022-12-21

## 2023-05-11 NOTE — ASSESSMENT
[FreeTextEntry1] : 68 yo PMH anxiety/depression, chronic sinusitis, BPH, ED, torn labrum both shoulder,  L hip labrum tear, bl knee surgeries, tinnitus presenting today to establish care.

## 2023-05-11 NOTE — HISTORY OF PRESENT ILLNESS
[FreeTextEntry1] : follow-up   [de-identified] : 66 yo PMH anxiety/depression, chronic sinusitis, BPH, ED, torn labrum both shoulder,  L hip labrum tear, bl knee surgeries, tinnitus, GERD presenting today to establish care. He was following with Dr. Mayo.  \par He follows w/ ortho (Dr. Dewitt and orthopedics in [facility]) urology (Dr. GRACE), Allergy (Dr. Land), ENT (Dr. Cardona) . \par He also does Durolane injections for his knees. 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

## 2023-05-11 NOTE — PLAN
[FreeTextEntry1] : #Establish care\par -discussed medical history \par -script for cologaurd provided \par -discussed dietary change from HLD, ASCVD 11.0%, decided to repeat lipid with annual in December, discussed dietary changes, he also has cardio apt in July \par -he will check in with ortho about Durolane knee injections \par -follow-up in July for annual exam

## 2023-06-12 ENCOUNTER — NON-APPOINTMENT (OUTPATIENT)
Age: 68
End: 2023-06-12

## 2023-06-12 ENCOUNTER — APPOINTMENT (OUTPATIENT)
Dept: CARDIOLOGY | Facility: CLINIC | Age: 68
End: 2023-06-12
Payer: MEDICARE

## 2023-06-12 VITALS
HEART RATE: 62 BPM | RESPIRATION RATE: 17 BRPM | WEIGHT: 164 LBS | HEIGHT: 73 IN | OXYGEN SATURATION: 100 % | BODY MASS INDEX: 21.74 KG/M2

## 2023-06-12 VITALS — SYSTOLIC BLOOD PRESSURE: 119 MMHG | HEART RATE: 72 BPM | DIASTOLIC BLOOD PRESSURE: 80 MMHG

## 2023-06-12 DIAGNOSIS — E78.5 HYPERLIPIDEMIA, UNSPECIFIED: ICD-10-CM

## 2023-06-12 PROCEDURE — 93000 ELECTROCARDIOGRAM COMPLETE: CPT

## 2023-06-12 PROCEDURE — 99204 OFFICE O/P NEW MOD 45 MIN: CPT

## 2023-06-12 NOTE — REASON FOR VISIT
[Other: ____] : [unfilled] [FreeTextEntry1] : This is a 68-year-old man who presents for cardiac assessment.  The patient is concerned because his father had an MI at 52 and his brother has had a stent.  He does state that neither one of them "took care of themselves".  His father was a smoker.  The patient has no complaints of chest discomfort shortness of breath palpitations dizziness or syncope.  He is a non-smoker occasional alcohol user nondiabetic nonhypertensive of note is the fact that his most recent lipid profile revealed a total cholesterol 248 HDL 79 .  Prior lipid levels however have been much more favorable.

## 2023-06-12 NOTE — ASSESSMENT
[FreeTextEntry1] : In summary, the patient is a 68-year-old man with an elevated lipid profile at last check that is somewhat not in keeping with prior tests.\par \par We have discussed the appropriate work-up for coronary disease and have discussed lipid-lowering goals.  I have suggested repeat lipid profile and if indeed his LDL cholesterol is significantly elevated would proceed to coronary calcium scoring.  The patient however states he has had a lot of radiation due to orthopedic issues and would prefer to avoid any radiation and will most probably opt for a stress test.  Depending on the results of lipid profile and the stress test further evaluation could be considered if necessary

## 2023-06-13 LAB
ALBUMIN SERPL ELPH-MCNC: 4.4 G/DL
ALP BLD-CCNC: 54 U/L
ALT SERPL-CCNC: 18 U/L
ANION GAP SERPL CALC-SCNC: 9 MMOL/L
AST SERPL-CCNC: 22 U/L
BILIRUB SERPL-MCNC: 0.6 MG/DL
BUN SERPL-MCNC: 10 MG/DL
CALCIUM SERPL-MCNC: 9.8 MG/DL
CHLORIDE SERPL-SCNC: 105 MMOL/L
CHOLEST SERPL-MCNC: 207 MG/DL
CO2 SERPL-SCNC: 27 MMOL/L
CREAT SERPL-MCNC: 0.87 MG/DL
EGFR: 95 ML/MIN/1.73M2
GLUCOSE BS SERPL-MCNC: 87 MG/DL
GLUCOSE SERPL-MCNC: 94 MG/DL
HDLC SERPL-MCNC: 60 MG/DL
LDLC SERPL CALC-MCNC: 131 MG/DL
NONHDLC SERPL-MCNC: 146 MG/DL
POTASSIUM SERPL-SCNC: 4.8 MMOL/L
PROT SERPL-MCNC: 6.5 G/DL
SODIUM SERPL-SCNC: 141 MMOL/L
T3 SERPL-MCNC: 99 NG/DL
T4 SERPL-MCNC: 5.8 UG/DL
TRIGL SERPL-MCNC: 78 MG/DL
TSH SERPL-ACNC: 2.22 UIU/ML

## 2023-06-14 LAB
ESTIMATED AVERAGE GLUCOSE: 114 MG/DL
HBA1C MFR BLD HPLC: 5.6 %

## 2023-07-05 ENCOUNTER — APPOINTMENT (OUTPATIENT)
Dept: CARDIOLOGY | Facility: CLINIC | Age: 68
End: 2023-07-05

## 2023-07-21 ENCOUNTER — APPOINTMENT (OUTPATIENT)
Dept: INTERNAL MEDICINE | Facility: CLINIC | Age: 68
End: 2023-07-21

## 2023-07-25 ENCOUNTER — APPOINTMENT (OUTPATIENT)
Dept: FAMILY MEDICINE | Facility: CLINIC | Age: 68
End: 2023-07-25
Payer: MEDICARE

## 2023-07-25 VITALS
HEIGHT: 73 IN | HEART RATE: 65 BPM | BODY MASS INDEX: 21.6 KG/M2 | SYSTOLIC BLOOD PRESSURE: 110 MMHG | WEIGHT: 163 LBS | OXYGEN SATURATION: 97 % | DIASTOLIC BLOOD PRESSURE: 72 MMHG | TEMPERATURE: 97.3 F | RESPIRATION RATE: 16 BRPM

## 2023-07-25 DIAGNOSIS — Z23 ENCOUNTER FOR IMMUNIZATION: ICD-10-CM

## 2023-07-25 DIAGNOSIS — Z00.00 ENCOUNTER FOR GENERAL ADULT MEDICAL EXAMINATION W/OUT ABNORMAL FINDINGS: ICD-10-CM

## 2023-07-25 DIAGNOSIS — Z13.21 ENCOUNTER FOR SCREENING FOR NUTRITIONAL DISORDER: ICD-10-CM

## 2023-07-25 DIAGNOSIS — R79.89 OTHER SPECIFIED ABNORMAL FINDINGS OF BLOOD CHEMISTRY: ICD-10-CM

## 2023-07-25 PROCEDURE — G0439: CPT

## 2023-07-25 NOTE — REVIEW OF SYSTEMS
[Heartburn] : heartburn [Negative] : Psychiatric [Abdominal Pain] : no abdominal pain [Diarrhea] : no diarrhea [Vomiting] : no vomiting [Melena] : no melena

## 2023-07-25 NOTE — ASSESSMENT
[FreeTextEntry1] : 69 yo PMH anxiety/depression, chronic sinusitis, BPH, ED, torn labrum both shoulder,  L hip labrum tear, bl knee surgeries, tinnitus, GERD, retinal detachment presenting today for CPE.

## 2023-07-25 NOTE — PLAN
[FreeTextEntry1] : #HCM \par -reviewed lab work done with cardio with patient \par   HLD: improved, ASCVD 10.4, discussed statin, he is considering stress test with cardio  \par -vaccinations: \par     COVID : had three vaccines \par     TDAP: ordered - can do vaccine apt with RN when interested \par     PNA: he had at least one vaccine - unclear of which PNA vaccine \par -depression screen negative \par -colon cancer screening: recently did Cologaurd, awaiting results \par -GERD- continuing with PP1/H2, f/u with GI \par -follows with derm for skin checks \par -mentions possible concern for R sided inguinal hernia - no pain, some fullness at times, exam wnl, discussed US, he will let me know if interested

## 2023-07-25 NOTE — HEALTH RISK ASSESSMENT
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

## 2023-07-25 NOTE — HISTORY OF PRESENT ILLNESS
[de-identified] : 69 yo PMH anxiety/depression, chronic sinusitis, BPH, ED, torn labrum both shoulder,  L hip labrum tear, bl knee surgeries, tinnitus, GERD, retinal detachment presenting today for CPE.  \par He follows w/ ortho (Dr. Dewitt and orthopedics in [facility]) urology (Dr. GRACE), Allergy (Dr. Land), ENT (Dr. Cardona), optho (Dr. Harris), Derm (Dr. Butcher)\par \par Since our last visit, he has followed up with cardio, he is considering doing a stress test.

## 2023-07-27 ENCOUNTER — APPOINTMENT (OUTPATIENT)
Dept: INTERNAL MEDICINE | Facility: CLINIC | Age: 68
End: 2023-07-27
Payer: MEDICARE

## 2023-07-27 PROCEDURE — 90715 TDAP VACCINE 7 YRS/> IM: CPT | Mod: GY

## 2023-07-27 PROCEDURE — 90471 IMMUNIZATION ADMIN: CPT

## 2023-07-28 LAB
25(OH)D3 SERPL-MCNC: 56.1 NG/ML
ANA SER IF-ACNC: NEGATIVE
APPEARANCE: CLEAR
BACTERIA: NEGATIVE /HPF
BILIRUBIN URINE: NEGATIVE
BLOOD URINE: NEGATIVE
CAST: 0 /LPF
CCP AB SER IA-ACNC: <8 UNITS
COLOR: YELLOW
CRP SERPL-MCNC: <3 MG/L
EPITHELIAL CELLS: 0 /HPF
ERYTHROCYTE [SEDIMENTATION RATE] IN BLOOD BY WESTERGREN METHOD: 4 MM/HR
GLUCOSE QUALITATIVE U: NEGATIVE MG/DL
KETONES URINE: NEGATIVE MG/DL
LEUKOCYTE ESTERASE URINE: NEGATIVE
MICROSCOPIC-UA: NORMAL
NITRITE URINE: NEGATIVE
PH URINE: 7
PROTEIN URINE: NEGATIVE MG/DL
PSA FREE FLD-MCNC: 41 %
PSA FREE SERPL-MCNC: 0.41 NG/ML
PSA SERPL-MCNC: 1.01 NG/ML
RED BLOOD CELLS URINE: 1 /HPF
RF+CCP IGG SER-IMP: NEGATIVE
RHEUMATOID FACT SER QL: <10 IU/ML
SPECIFIC GRAVITY URINE: 1.01
TESTOST FREE SERPL-MCNC: 3.5 PG/ML
TESTOST SERPL-MCNC: 412 NG/DL
UROBILINOGEN URINE: 0.2 MG/DL
WHITE BLOOD CELLS URINE: 0 /HPF

## 2023-09-13 ENCOUNTER — APPOINTMENT (OUTPATIENT)
Dept: ORTHOPEDIC SURGERY | Facility: CLINIC | Age: 68
End: 2023-09-13
Payer: MEDICARE

## 2023-09-13 VITALS — BODY MASS INDEX: 21.47 KG/M2 | HEIGHT: 73 IN | WEIGHT: 162 LBS

## 2023-09-13 DIAGNOSIS — M23.91 UNSPECIFIED INTERNAL DERANGEMENT OF RIGHT KNEE: ICD-10-CM

## 2023-09-13 PROCEDURE — 99213 OFFICE O/P EST LOW 20 MIN: CPT

## 2023-09-19 ENCOUNTER — APPOINTMENT (OUTPATIENT)
Age: 68
End: 2023-09-19

## 2023-09-26 ENCOUNTER — OUTPATIENT (OUTPATIENT)
Dept: OUTPATIENT SERVICES | Facility: HOSPITAL | Age: 68
LOS: 1 days | End: 2023-09-26
Payer: MEDICARE

## 2023-09-26 ENCOUNTER — APPOINTMENT (OUTPATIENT)
Dept: MRI IMAGING | Facility: HOSPITAL | Age: 68
End: 2023-09-26
Payer: MEDICARE

## 2023-09-26 DIAGNOSIS — M23.91 UNSPECIFIED INTERNAL DERANGEMENT OF RIGHT KNEE: ICD-10-CM

## 2023-09-26 PROCEDURE — 73721 MRI JNT OF LWR EXTRE W/O DYE: CPT | Mod: 26,RT,MH

## 2023-09-26 PROCEDURE — 73721 MRI JNT OF LWR EXTRE W/O DYE: CPT | Mod: MH

## 2023-09-28 ENCOUNTER — APPOINTMENT (OUTPATIENT)
Dept: CARDIOLOGY | Facility: CLINIC | Age: 68
End: 2023-09-28

## 2023-09-28 ENCOUNTER — APPOINTMENT (OUTPATIENT)
Dept: FAMILY MEDICINE | Facility: CLINIC | Age: 68
End: 2023-09-28

## 2023-10-04 ENCOUNTER — APPOINTMENT (OUTPATIENT)
Dept: MRI IMAGING | Facility: CLINIC | Age: 68
End: 2023-10-04

## 2023-10-13 RX ORDER — HYDROCORTISONE 25 MG/G
2.5 CREAM TOPICAL TWICE DAILY
Qty: 1 | Refills: 2 | Status: ACTIVE | COMMUNITY
Start: 2020-04-06 | End: 1900-01-01

## 2023-10-16 ENCOUNTER — APPOINTMENT (OUTPATIENT)
Dept: INTERNAL MEDICINE | Facility: CLINIC | Age: 68
End: 2023-10-16

## 2023-10-25 ENCOUNTER — APPOINTMENT (OUTPATIENT)
Dept: UROLOGY | Facility: CLINIC | Age: 68
End: 2023-10-25
Payer: MEDICARE

## 2023-10-25 VITALS
TEMPERATURE: 98.5 F | WEIGHT: 160 LBS | DIASTOLIC BLOOD PRESSURE: 68 MMHG | HEART RATE: 79 BPM | BODY MASS INDEX: 21.2 KG/M2 | SYSTOLIC BLOOD PRESSURE: 110 MMHG | OXYGEN SATURATION: 98 % | HEIGHT: 73 IN

## 2023-10-25 DIAGNOSIS — N13.8 BENIGN PROSTATIC HYPERPLASIA WITH LOWER URINARY TRACT SYMPMS: ICD-10-CM

## 2023-10-25 DIAGNOSIS — Z12.5 ENCOUNTER FOR SCREENING FOR MALIGNANT NEOPLASM OF PROSTATE: ICD-10-CM

## 2023-10-25 DIAGNOSIS — N40.1 BENIGN PROSTATIC HYPERPLASIA WITH LOWER URINARY TRACT SYMPMS: ICD-10-CM

## 2023-10-25 PROCEDURE — 51798 US URINE CAPACITY MEASURE: CPT

## 2023-10-25 PROCEDURE — 51741 ELECTRO-UROFLOWMETRY FIRST: CPT

## 2023-10-25 PROCEDURE — 99205 OFFICE O/P NEW HI 60 MIN: CPT

## 2023-10-27 LAB
APPEARANCE: CLEAR
BACTERIA UR CULT: NORMAL
BACTERIA: NEGATIVE /HPF
BILIRUBIN URINE: NEGATIVE
BLOOD URINE: NEGATIVE
CAST: 0 /LPF
COLOR: YELLOW
EPITHELIAL CELLS: 0 /HPF
GLUCOSE QUALITATIVE U: NEGATIVE MG/DL
KETONES URINE: NEGATIVE MG/DL
LEUKOCYTE ESTERASE URINE: NEGATIVE
MICROSCOPIC-UA: NORMAL
NITRITE URINE: NEGATIVE
PH URINE: 7.5
PROTEIN URINE: NEGATIVE MG/DL
RED BLOOD CELLS URINE: 0 /HPF
SPECIFIC GRAVITY URINE: 1.01
UROBILINOGEN URINE: 0.2 MG/DL
WHITE BLOOD CELLS URINE: 0 /HPF

## 2024-05-17 ENCOUNTER — OUTPATIENT (OUTPATIENT)
Dept: OUTPATIENT SERVICES | Facility: HOSPITAL | Age: 69
LOS: 1 days | End: 2024-05-17
Payer: MEDICARE

## 2024-05-17 ENCOUNTER — APPOINTMENT (OUTPATIENT)
Dept: MRI IMAGING | Facility: HOSPITAL | Age: 69
End: 2024-05-17
Payer: MEDICARE

## 2024-05-17 DIAGNOSIS — Z00.8 ENCOUNTER FOR OTHER GENERAL EXAMINATION: ICD-10-CM

## 2024-05-17 PROCEDURE — 73721 MRI JNT OF LWR EXTRE W/O DYE: CPT

## 2024-05-17 PROCEDURE — 73721 MRI JNT OF LWR EXTRE W/O DYE: CPT | Mod: 26,RT,MH

## 2024-06-10 ENCOUNTER — APPOINTMENT (OUTPATIENT)
Dept: UROLOGY | Facility: CLINIC | Age: 69
End: 2024-06-10
Payer: MEDICARE

## 2024-06-10 VITALS
BODY MASS INDEX: 21.4 KG/M2 | OXYGEN SATURATION: 98 % | HEART RATE: 69 BPM | DIASTOLIC BLOOD PRESSURE: 60 MMHG | SYSTOLIC BLOOD PRESSURE: 108 MMHG | HEIGHT: 72 IN | WEIGHT: 158 LBS | TEMPERATURE: 97.7 F

## 2024-06-10 PROCEDURE — 99214 OFFICE O/P EST MOD 30 MIN: CPT

## 2024-06-10 RX ORDER — ALFUZOSIN HYDROCHLORIDE 10 MG/1
10 TABLET, EXTENDED RELEASE ORAL DAILY
Qty: 14 | Refills: 1 | Status: ACTIVE | COMMUNITY
Start: 2024-06-10 | End: 1900-01-01

## 2024-06-10 RX ORDER — TAMSULOSIN HYDROCHLORIDE 0.4 MG/1
0.4 CAPSULE ORAL AT BEDTIME
Qty: 5 | Refills: 0 | Status: DISCONTINUED | COMMUNITY
Start: 2023-10-25 | End: 2024-06-10

## 2024-06-10 NOTE — ASSESSMENT
[FreeTextEntry1] : 69M presents to discuss BPH/LUTS. Pt has had very short trial of silodosin in the past and more recently w/ tamsulosin but was d/c 2/2 tinnitus. Pt would like to try another alpha blocker.  BPH/LUTS - Will order short course of alfuzosin for pt to trial - The patient understands that this medication may cause dizziness, retrograde ejaculation or nasal congestion among other complications. I recommended that the patient take this medication at night. If the side effects become too bothersome, the medication can be discontinued. -UA/Ucx reviewed -Flow/PVR today and at next visit  -Discussed the different types of outlet procedure available, indication, side effects, etc.  Prostate Cancer Screening - PSAs reviewed - Plan for q6-12 month PSAs w/ PCP  RTC in 6 months (spends part of yr in [place])    Zeinab Glass MD Chief of Urology, [address], [address] [place] [address] Phone: [phone] Fax: [phone]

## 2024-06-10 NOTE — HISTORY OF PRESENT ILLNESS
[FreeTextEntry1] : Referring Provider: Dr. Dory Glass Chief Complaint: LUTS Date of first visit: 10/25/2023  URMILA ORTEGA is a 68-year-old  man with no significant PMHx who presents for evaluation of BPH/LUTS. Pt reports mild to moderate LUTS, w/ urge being the most bothersome. He has nocturia x2-3, frequency, weakened stream, and dribbling. He notes that sx worsen when he is constipated. Denies hesitance, straining, or incontinence. He has never gone into retention, required catheterization, or had an outlet procedure. He tried a few doses of silodosin in the past, but it exacerbated his tinnitus. Stats that he drinks 1.5 cups of decaffeinated tea daily, and a half glass of wine 5 nights a week. He avoids spicy foods.  He recently took 1 dose of tamsulosin and stated that it made his tenderness acutely worse.  He did notice that it did help his stream.  He still struggling with constipation.  PSA Hx 1.01 07/26/23 1.05 07/30/22 0.85 06/09/21 0.83 05/27/20 0.71 06/01/19 0.75 12/17/18 0.98 12/30/17 0.62 12/10/16  Biopsy Hx: n/a MRI Hx: n/a  06/10/24 IPSS 12 QOL 2-3 PEDRO 19 Flow/PVR: qmax = 13.9 ml/s, avg = 6.2 ml/s, VV = 123 ml, PVR = 48 ml   10/25/2023 IPSS 10 QOL 1 PEDRO 21 Flow/PVR: qmax = 11.5 ml/s, avg = 6.6 ml/s, VV = 331 ml, PVR = 50 ml ** pt did UroFlow prior to interview, and then bladder scan 40 min later showed 350cc; pt then voided 300cc spontaneously  PMHx: tinnitus SxHx: no abdominal or pelvic surgeries FHx: no  related malignancies, father had heart attack in early 50s SocHx: never smoker but around secondhand smoke as child, social alcohol use Allergies: NKDA  The patient denies fevers, chills, nausea and or vomiting and no unexplained weight loss. All pertinent laboratory, films and physician notes were reviewed. Questionnaire results were discussed with patient.

## 2024-06-10 NOTE — PHYSICAL EXAM
[General Appearance - Well Developed] : well developed [General Appearance - Well Nourished] : well nourished [Normal Appearance] : normal appearance [Well Groomed] : well groomed [General Appearance - In No Acute Distress] : no acute distress [Edema] : no peripheral edema [Respiration, Rhythm And Depth] : normal respiratory rhythm and effort [Exaggerated Use Of Accessory Muscles For Inspiration] : no accessory muscle use [Abdomen Soft] : soft [Abdomen Tenderness] : non-tender [Costovertebral Angle Tenderness] : no ~M costovertebral angle tenderness [Urethral Meatus] : meatus normal [Penis Abnormality] : normal circumcised penis [Urinary Bladder Findings] : the bladder was normal on palpation [Scrotum] : the scrotum was normal [Epididymis] : the epididymides were normal [Testes Tenderness] : no tenderness of the testes [Testes Mass (___cm)] : there were no testicular masses [Normal Station and Gait] : the gait and station were normal for the patient's age [Skin Color & Pigmentation] : normal skin color and pigmentation [] : no rash [No Focal Deficits] : no focal deficits [Oriented To Time, Place, And Person] : oriented to person, place, and time [Affect] : the affect was normal [Mood] : the mood was normal [Not Anxious] : not anxious [de-identified] : No inguinal hernias appreciated

## 2024-08-01 ENCOUNTER — NON-APPOINTMENT (OUTPATIENT)
Age: 69
End: 2024-08-01

## 2024-08-01 DIAGNOSIS — R23.8 OTHER SKIN CHANGES: ICD-10-CM

## 2024-08-01 DIAGNOSIS — Z87.09 PERSONAL HISTORY OF OTHER DISEASES OF THE RESPIRATORY SYSTEM: ICD-10-CM

## 2024-08-01 DIAGNOSIS — L57.0 ACTINIC KERATOSIS: ICD-10-CM

## 2024-08-01 DIAGNOSIS — L57.8 OTHER SKIN CHANGES DUE TO CHRONIC EXPOSURE TO NONIONIZING RADIATION: ICD-10-CM

## 2024-08-01 DIAGNOSIS — L30.8 OTHER SPECIFIED DERMATITIS: ICD-10-CM

## 2024-08-01 DIAGNOSIS — L82.1 OTHER SEBORRHEIC KERATOSIS: ICD-10-CM

## 2024-08-01 RX ORDER — FLUOROURACIL 5 MG/G
0.5 CREAM TOPICAL
Refills: 0 | Status: ACTIVE | COMMUNITY

## 2024-08-09 ENCOUNTER — NON-APPOINTMENT (OUTPATIENT)
Age: 69
End: 2024-08-09

## 2024-08-10 ENCOUNTER — APPOINTMENT (OUTPATIENT)
Dept: DERMATOLOGY | Facility: CLINIC | Age: 69
End: 2024-08-10

## 2024-08-10 PROBLEM — L30.0 NUMMULAR DERMATITIS: Status: ACTIVE | Noted: 2024-08-10

## 2024-08-10 PROBLEM — L21.9 SEBORRHEIC DERMATITIS: Status: ACTIVE | Noted: 2024-08-01

## 2024-08-10 PROCEDURE — 99203 OFFICE O/P NEW LOW 30 MIN: CPT

## 2024-08-10 PROCEDURE — 99213 OFFICE O/P EST LOW 20 MIN: CPT

## 2024-08-10 NOTE — HISTORY OF PRESENT ILLNESS
[FreeTextEntry1] : rash at surgical wound sites [de-identified] : s/p right knee repair 6 weeks ago. Well healed main wound. Supplementary entry points have 2x4cm scaly erythematous plaques. Applied triamcinolone ointment 1 time.

## 2024-08-10 NOTE — ASSESSMENT
[FreeTextEntry1] : Alert, oriented, well, pleasant.  Erythematous scaly plaque 2x4 cm anterior mid thigh. Similar 2x5 cm plaque anterior superior leg. Well healing hypertrophic scar thigh to lateral right knee to leg. No evidence infection.  Dermatitis, nummular. ? contact, adhesive. Denies use of otc products including antibiotics. Start betamethasone augmented cream bid for 2 weeks. f/u if not resolved.  Seborrheic Dermatitis. Applying head & shoulders shampoo daily. Applying Cetaphil rough & bumpy cream twice per day. Crusts are easily removed and peeling off. Improved. Continue.

## 2024-08-15 ENCOUNTER — APPOINTMENT (OUTPATIENT)
Dept: FAMILY MEDICINE | Facility: CLINIC | Age: 69
End: 2024-08-15
Payer: MEDICARE

## 2024-08-15 VITALS
RESPIRATION RATE: 16 BRPM | WEIGHT: 157 LBS | OXYGEN SATURATION: 96 % | HEIGHT: 72 IN | BODY MASS INDEX: 21.26 KG/M2 | SYSTOLIC BLOOD PRESSURE: 130 MMHG | HEART RATE: 65 BPM | DIASTOLIC BLOOD PRESSURE: 72 MMHG | TEMPERATURE: 97.6 F

## 2024-08-15 DIAGNOSIS — Z00.00 ENCOUNTER FOR GENERAL ADULT MEDICAL EXAMINATION W/OUT ABNORMAL FINDINGS: ICD-10-CM

## 2024-08-15 PROCEDURE — G0439: CPT

## 2024-08-15 NOTE — HISTORY OF PRESENT ILLNESS
[FreeTextEntry1] : annual exam  [de-identified] : 70 yo PMH anxiety/depression, chronic sinusitis, BPH, ED, torn labrum both shoulder, L hip labrum tear, bl knee surgeries, tinnitus, GERD, retinal detachment presenting today for annual exam.  He follows w/ ortho (Dr. Dewitt and orthopedics in [facility]) urology (Dr. Zeinab Glass), Allergy (Dr. Land), ENT (Dr. Cardona), ophthalmology (Dr. Harris), Derm (Dr. Butcher), GI (Dr. Roque).  He is interested in another type of colon cancer screening - he will let me know information.  He had a R knee partial replacement - recovering well.  He follows w/ GI for constipation that is controlled w/ medication.   He takes Pepcid PRN for GERD.

## 2024-08-15 NOTE — PLAN
[FreeTextEntry1] : #HCM -lab work script provided, including requested labs  -vaccinations:  COVID : had three vaccines  TDAP:up to date   PNA: Prevnar 20? (may have received) - did receive a PNA vaccine  -depression screen negative -colon cancer screening: up to date w/ [facility], last colonoscopy 2015, he will let e know about [facility]  -follows with derm for skin checks

## 2024-08-15 NOTE — ASSESSMENT
[FreeTextEntry1] : 70 yo PMH anxiety/depression, chronic sinusitis, BPH, ED, torn labrum both shoulder, L hip labrum tear, bl knee surgeries, tinnitus, GERD, retinal detachment presenting today for annual exam.

## 2024-08-15 NOTE — PHYSICAL EXAM
[Normal] : normal rate, regular rhythm, normal S1 and S2 and no murmur heard [Soft] : abdomen soft [Non Tender] : non-tender [Coordination Grossly Intact] : coordination grossly intact

## 2024-08-15 NOTE — HEALTH RISK ASSESSMENT
[With Significant Other] : lives with significant other [Retired] : retired [Significant Other] : lives with significant other [Yes] : Yes [2 - 3 times a week (3 pts)] : 2 - 3  times a week (3 points) [1 or 2 (0 pts)] : 1 or 2 (0 points) [Never (0 pts)] : Never (0 points) [No] : In the past 12 months have you used drugs other than those required for medical reasons? 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

## 2024-08-21 ENCOUNTER — APPOINTMENT (OUTPATIENT)
Dept: DERMATOLOGY | Facility: CLINIC | Age: 69
End: 2024-08-21
Payer: MEDICARE

## 2024-08-21 DIAGNOSIS — L21.9 SEBORRHEIC DERMATITIS, UNSPECIFIED: ICD-10-CM

## 2024-08-21 DIAGNOSIS — L30.8 OTHER SPECIFIED DERMATITIS: ICD-10-CM

## 2024-08-21 PROCEDURE — 99213 OFFICE O/P EST LOW 20 MIN: CPT

## 2024-08-21 NOTE — HISTORY OF PRESENT ILLNESS
Patient Seen in: [facility] Emergency Department      History     Chief Complaint   Patient presents with    Abdomen/Flank Pain     Stated Complaint: LLQ pain    Subjective:   HPI    43-year-old female presents today for evaluation of abdominal pain.  Over the past 2 weeks, patient has had some loose stools after eating.  On Thursday, she began having an epigastric abdominal pain she was seen at the immediate care and diagnosed with colitis. The pain intensified today and it began to migrate to the left lateral abdomen.  She denies any fevers, vomiting, dysuria or hematuria.    Objective:   Past Medical History:   Diagnosis Date    Anxiety     Depression 2021    Nasal congestion     Obesity 3/13/2023              Past Surgical History:   Procedure Laterality Date    HYSTERECTOMY  2021    LAP GASTRIC BYPASS/CONSTANCE-EN-Y        1999                Social History     Socioeconomic History    Marital status:    Tobacco Use    Smoking status: Never    Smokeless tobacco: Never   Vaping Use    Vaping Use: Never used   Substance and Sexual Activity    Alcohol use: Yes     Alcohol/week: 1.0 standard drink of alcohol     Types: 1 Standard drinks or equivalent per week     Comment: Socially    Drug use: No    Sexual activity: Yes     Partners: Male     Birth control/protection: Hysterectomy   Other Topics Concern    Caffeine Concern Yes     Comment: 1 cup of coffee daily    Stress Concern No    Weight Concern Yes     Comment: Not able to lose weight    Special Diet No    Exercise Yes     Comment: 6-7 x weekly    Seat Belt Yes              Review of Systems    Positive for stated complaint: LLQ pain  Other systems are as noted in HPI.  Constitutional and vital signs reviewed.      All other systems reviewed and negative except as noted above.    Physical Exam     ED Triage Vitals   BP 24 2242 127/70   Pulse 24 2242 80   Resp 24 2242 18   Temp 24 2242 98.1 °F (36.7 °C)   Temp  [FreeTextEntry1] : f/u seborrheic dermatitis src --    SpO2 03/29/24 2242 98 %   O2 Device 03/30/24 0315 None (Room air)       Current:/71   Pulse 61   Temp 98.1 °F (36.7 °C)   Resp 15   Ht 149.9 cm (4' 11\")   Wt 63.5 kg   LMP 10/19/2021   SpO2 99%   BMI 28.28 kg/m²         Physical Exam  Vitals and nursing note reviewed.   Constitutional:       Appearance: Normal appearance.   HENT:      Head: Normocephalic.      Nose: Nose normal.      Mouth/Throat:      Mouth: Mucous membranes are moist.   Eyes:      Extraocular Movements: Extraocular movements intact.   Cardiovascular:      Rate and Rhythm: Normal rate.   Pulmonary:      Effort: Pulmonary effort is normal.   Abdominal:      General: Abdomen is flat.      Tenderness: There is abdominal tenderness in the epigastric area. There is no guarding or rebound.   Musculoskeletal:         General: Normal range of motion.   Skin:     General: Skin is warm.   Neurological:      General: No focal deficit present.      Mental Status: She is alert.   Psychiatric:         Mood and Affect: Mood normal.           ED Course     Labs Reviewed   URINALYSIS, ROUTINE - Abnormal; Notable for the following components:       Result Value    Ketones Urine Trace (*)     Blood Urine Trace-Intact (*)     All other components within normal limits   UA MICROSCOPIC ONLY, URINE - Abnormal; Notable for the following components:    Squamous Epi. Cells Few (*)     All other components within normal limits   COMP METABOLIC PANEL (14) - Normal   LIPASE - Normal   HCG, BETA SUBUNIT, QUAL - Normal   CBC WITH DIFFERENTIAL WITH PLATELET    Narrative:     The following orders were created for panel order CBC With Differential With Platelet.  Procedure                               Abnormality         Status                     ---------                               -----------         ------                     CBC W/ DIFFERENTIAL[[id number]]                              Final result                 Please view results for these tests  [de-identified] : Improved on  H&S shampoo and Cetaphil rough & bumpy cream. Right knee replacement wounds improved. On betamethasone cream to secondary incisions. Healing well. No erythema or scale. on the individual orders.   GI STOOL PANEL BY PCR   C. DIFFICILE(TOXIGENIC)PCR   CBC W/ DIFFERENTIAL             CT ABDOMEN AND PELVIS WITH IV CONTRAST    IMPRESSION  Compared to 3/28/24  Gastric sleeve procedure.  Hysterectomy.    Appendix is not visualized.  No inflammation in the pericecal region.  No renal or obstructing ureteral stones.  No hydronephrosis or hydroureter.    Bowel is without evidence of obstruction.  Gallbladder is unremarkable.  No obvious stones but CT has a diminished sensitivity for noncalcified gallstones.  No biliary dilatation.    Trace free fluid in the pelvis likely physiologic/ovarian, assuming no clinical symptoms of PID.  No gross adnexal masses.         MDM      Differential Diagnosis  43-year-old female presents today for persistent upper abdominal pain in the setting of 2 weeks of diarrhea.  Her abdomen is soft without guarding or rigidity.  She does have some upper abdominal tenderness.  Differential would include worsening colitis, bowel perforation, intra-abdominal abscess, pancreatitis.  There is no report of any ductal dilatation in the hepatobiliary tract yesterday.  The pancreas was not inflamed.  Will obtain CT along with labs.  Will order stool studies the patient to provide sample.  Will reassess.    6:10 am  Patient's CT scan did not demonstrate significant acute abnormality.  Her workup is reassuring.  On reassessment, patient reports improvement to her pain.  With her clinical improvement and reassuring workup, I do feel patient is stable for discharge home.  She has an appointment in place with GI for follow-up on April 12.  Will DC with a course of Pepcid given her improvement here along with Carafate.  I advised that she follow-up as instructed and return for any worsening symptoms.  She already has an order for stool studies.    External Chart Reviewed  I reviewed the IC note and CT scan from March 28                                   Medical Decision  Making      Disposition and Plan     Clinical Impression:  1. Epigastric pain         Disposition:  Discharge  3/30/2024  6:12 am    Follow-up:  Benjamin Villafana MD  [address] [place] [address]  [phone]    Call in 1 day(s)            Medications Prescribed:  Current Discharge Medication List        START taking these medications    Details   sucralfate 1 g Oral Tab Take 1 tablet (1 g total) by mouth 4 (four) times daily before meals and nightly for 7 days.  Qty: 28 tablet, Refills: 0      famotidine (PEPCID) 20 MG Oral Tab Take 1 tablet (20 mg total) by mouth 2 (two) times daily as needed for Heartburn.  Qty: 14 tablet, Refills: 0

## 2024-08-21 NOTE — ASSESSMENT
[FreeTextEntry1] : Alert, oriented, well, pleasant.  50% decrease in thick scaling on scalp. Continue H&S shampoo. Try TSal next. Leave on 3 minutes qd. Continue Cetaphil rough & bumpy bid. Scaly erythema nasolabial folds.  Shampoo for 2 minutes. May use hydrocortisone 2.5% cream bid prn itch. Seborrheic dermatitis.  No erythema on right thigh and leg wounds. Discontinue betamethasone. Xerosis legs. Asteototic Dermatitis. Cetaphil Cream once to twice per day. Reevaluate scap in 2-3 months for possible cryotherapy of solar keratoses.

## 2024-09-11 ENCOUNTER — APPOINTMENT (OUTPATIENT)
Dept: ENDOCRINOLOGY | Facility: CLINIC | Age: 69
End: 2024-09-11
Payer: MEDICARE

## 2024-09-11 VITALS
SYSTOLIC BLOOD PRESSURE: 132 MMHG | RESPIRATION RATE: 17 BRPM | DIASTOLIC BLOOD PRESSURE: 68 MMHG | WEIGHT: 155 LBS | HEIGHT: 72 IN | TEMPERATURE: 97.3 F | BODY MASS INDEX: 20.99 KG/M2 | HEART RATE: 63 BPM | OXYGEN SATURATION: 98 %

## 2024-09-11 DIAGNOSIS — R79.89 OTHER SPECIFIED ABNORMAL FINDINGS OF BLOOD CHEMISTRY: ICD-10-CM

## 2024-09-11 DIAGNOSIS — N52.9 MALE ERECTILE DYSFUNCTION, UNSPECIFIED: ICD-10-CM

## 2024-09-11 PROCEDURE — 99204 OFFICE O/P NEW MOD 45 MIN: CPT

## 2024-09-11 NOTE — HISTORY OF PRESENT ILLNESS
[FreeTextEntry1] : Problems: 1. Evaluation for hypogonadism  Evaluation for hypogonadism 1. From July 2022 to August 2024, his free testosterone level done at [facility] labs were low or undetectable. [facility] 2016[facility] 2017, 2018 [facility] 2. Labs: August 2024 - Total testosterone level - 508 ng/dl, free testosterone 2.4 (5.9 to 27) 3. No personal or family history of prostate cancer, patient has BPH, no hepatic disorders, no cardiac issues, no polycythemia  4. Symptoms - patient has erectile dysfunction but does not use sildenafil as this exacerbates his tinnitus, no fatigue, no osteoporosis 5. Meds: Patient used testosterone gel for 2 months in 2014 but he stopped this due to an inner ear problem

## 2024-09-11 NOTE — ASSESSMENT
[FreeTextEntry1] : The patient has a low free testosterone - this test is not as accurate so I ordered a free testosterone level via equilibrium dialysis.   I also ordered labs to evaluate for hypogonadism.  The patient also has erectile dysfunction but he cannot tolerate sildenafil as it worsens his tinnitus.  Plan: 1. Labs to be done next week: Day 1 -  8 am to 10 am - fasting - LH, FSH, Total and free testosterone, free testosterone via equilibrium dialysis, SHBG level Day 2 - 8 am to 10 am - fasting - Total and free testosterone, free testosterone via equilibrium dialysis 2. Follow up in 3 weeks to review results - telehealth visit ok

## 2024-09-16 LAB
FSH SERPL-MCNC: 9.6 IU/L
LH SERPL-ACNC: 5.6 IU/L

## 2024-09-17 ENCOUNTER — APPOINTMENT (OUTPATIENT)
Dept: DERMATOLOGY | Facility: CLINIC | Age: 69
End: 2024-09-17
Payer: MEDICARE

## 2024-09-17 DIAGNOSIS — L21.9 SEBORRHEIC DERMATITIS, UNSPECIFIED: ICD-10-CM

## 2024-09-17 DIAGNOSIS — D48.5 NEOPLASM OF UNCERTAIN BEHAVIOR OF SKIN: ICD-10-CM

## 2024-09-17 PROCEDURE — 99212 OFFICE O/P EST SF 10 MIN: CPT

## 2024-09-17 NOTE — HISTORY OF PRESENT ILLNESS
[FreeTextEntry1] : f/u seborrheic dermatitis and asteototic dermatitis. "Much better." [de-identified] : Using H&S shampoo daily, leaving on 10 min prior to shower. Notes mild redness. Is 90da post-op knee replacement. Going for dental work.

## 2024-09-17 NOTE — ASSESSMENT
[FreeTextEntry1] : Alert, oriented, well, pleasant.  appr 15 yellow thin crusts scalp. Significant improvement.  Seborrheic dermatitis. Continue H&S shampoo, apply to wet scalp and leave on 5 min prior to washing.   No itch. Not using hcn cream.  Notes new bump right lateral mid calf. 1.0cm thin scaly erythematous plaque. NUB ?Solar keratosis. For Cryo. Will see derm in [place] in 1 month.

## 2024-09-19 ENCOUNTER — APPOINTMENT (OUTPATIENT)
Dept: PULMONOLOGY | Facility: CLINIC | Age: 69
End: 2024-09-19
Payer: MEDICARE

## 2024-09-19 VITALS
OXYGEN SATURATION: 96 % | SYSTOLIC BLOOD PRESSURE: 108 MMHG | WEIGHT: 159 LBS | BODY MASS INDEX: 21.07 KG/M2 | HEIGHT: 73 IN | HEART RATE: 91 BPM | DIASTOLIC BLOOD PRESSURE: 70 MMHG

## 2024-09-19 DIAGNOSIS — R93.89 ABNORMAL FINDINGS ON DIAGNOSTIC IMAGING OF OTHER SPECIFIED BODY STRUCTURES: ICD-10-CM

## 2024-09-19 DIAGNOSIS — K21.9 GASTRO-ESOPHAGEAL REFLUX DISEASE W/OUT ESOPHAGITIS: ICD-10-CM

## 2024-09-19 DIAGNOSIS — J45.909 UNSPECIFIED ASTHMA, UNCOMPLICATED: ICD-10-CM

## 2024-09-19 LAB
TESTOST FREE SERPL-MCNC: 5.6 PG/ML
TESTOST SERPL-MCNC: 540 NG/DL

## 2024-09-19 PROCEDURE — 99214 OFFICE O/P EST MOD 30 MIN: CPT

## 2024-09-19 NOTE — ASSESSMENT
[FreeTextEntry1] : 69 year old male with chronic sinusitis, GERD, postnasal congestion and occasional cough.  He denies dyspnea or mucous production, just clears mucous from postnasal congestion. ON PE his VSS lungs are clear Cor RRR, Ext without edema. Reviewed several films, most recent from 2022 which was an apical lordotic view.    Impression: chronic sinusitis and GERD with reflux into upper airway during the night.  Rec: Reassured patient that one ingestion of dulculax containing mineral oil would not likely harm his lungs. We discussed dietary measures to minimize reflux. Chest PA and Lateral to evaluate apical pleural thickening.   Will advise after Chest X Ray.

## 2024-09-19 NOTE — HISTORY OF PRESENT ILLNESS
[Never] : never [TextBox_4] : 69 year old male with  anxiety/depression, chronic sinusitis, BPH, torn labrum both shoulder, L hip labrum tear, bl knee surgeries, tinnitus, GERD, retinal detachment presenting today for pulmonary evaluation and intermittent cough.  PMH notable for chronic sinus issues- allergic rhinitis with postnasal congestion. Also with GERD, which results in him requiring to clear his throat. Reports that he has difficulty at night- acid rises into his throat and he has to sit up. Coughs on occasion. Does not think he has a hiatal hernia.  He was on PPIs, helped, does not want to take them long term.  Takes H2 blockers as well, He practices dietary modification.   He is worried about aspiration of mineral oil. He took dulculax which has mineral oil in it.  Has had CXRs which I reviewed.  Denies shortness of breath, occasional cough, has throat clearing from PND. Occasional wheeze., no chest discomfort.  Worked as a banker.  No pets.   He is sensitive to smells-develops nasal congestion.

## 2024-09-19 NOTE — REVIEW OF SYSTEMS
[Nasal Congestion] : nasal congestion [Postnasal Drip] : postnasal drip [Sinus Problems] : sinus problems [GERD] : gerd [Negative] : Musculoskeletal

## 2024-09-21 ENCOUNTER — APPOINTMENT (OUTPATIENT)
Dept: RADIOLOGY | Facility: CLINIC | Age: 69
End: 2024-09-21
Payer: MEDICARE

## 2024-09-21 PROCEDURE — 71046 X-RAY EXAM CHEST 2 VIEWS: CPT

## 2024-09-22 LAB — SHBG-ESOTERIX: 62.2 NMOL/L

## 2024-09-24 ENCOUNTER — NON-APPOINTMENT (OUTPATIENT)
Age: 69
End: 2024-09-24

## 2024-09-30 ENCOUNTER — APPOINTMENT (OUTPATIENT)
Dept: ENDOCRINOLOGY | Facility: CLINIC | Age: 69
End: 2024-09-30
Payer: MEDICARE

## 2024-09-30 DIAGNOSIS — N52.9 MALE ERECTILE DYSFUNCTION, UNSPECIFIED: ICD-10-CM

## 2024-09-30 DIAGNOSIS — R79.89 OTHER SPECIFIED ABNORMAL FINDINGS OF BLOOD CHEMISTRY: ICD-10-CM

## 2024-09-30 PROCEDURE — 99214 OFFICE O/P EST MOD 30 MIN: CPT

## 2024-09-30 NOTE — ASSESSMENT
[FreeTextEntry1] : The patient has a low free testosterone done at [facility]  - this test is not as accurate -  free testosterone level via equilibrium dialysis was normal on two occasions.  The patient's SHBG level was normal and total testosterone level was in the 500s and his free testosterone level was normal at the [facility] so the patient does not have hypogonadism.   The patient also has erectile dysfunction but he cannot tolerate sildenafil as it worsens his tinnitus.  Plan: 1. Follow up with PCP 2. Follow up with Endocrinology as needed

## 2024-09-30 NOTE — HISTORY OF PRESENT ILLNESS
[FreeTextEntry1] : Problems: 1. Evaluation for hypogonadism  Evaluation for hypogonadism 1. From July 2022 to August 2024, his free testosterone level done at [facility] labs were low or undetectable. [facility] 2016, 2017, 2018 [facility] 2. Labs: August 2024 - Total testosterone level - 508 ng/dl, free testosterone 2.4 (5.9 to 27) 09/16/2024 - LH N, FSH N, SHBG level 62.2 (19.3 to 76.4), free testosterone level via equilibrium dialysis 55 (52 to 280), total testosterone at [facility] 540, total testosterone level at [facility] 502,  free testosterone level at [facility] 5.6 (5.9 to 27) 09/19/2024 -  free testosterone level via equilibrium dialysis 61 (52 to 280), total testosterone level at [facility] lab 473, total testosterone at [facility] 540, free testosterone level at [facility] 1.9 (5.9 to 27) 3. No personal or family history of prostate cancer, patient has BPH, no hepatic disorders, no cardiac issues, no polycythemia  4. Symptoms - patient has erectile dysfunction but does not use sildenafil as this exacerbates his tinnitus, no fatigue, no osteoporosis 5. Meds: Patient used testosterone gel for 2 months in 2014 but he stopped this due to an inner ear problem

## 2024-09-30 NOTE — PHYSICAL EXAM
[de-identified] : General: No distress, well nourished Eyes: Normal external appearance ENT: Normal appearance of the nose Neck/Thyroid: No visible neck swelling Respiratory: No use of accessory muscles of respiration Psychiatry: Patient converses normally, good judgement and insight Skin: No rashes seen on face

## 2024-09-30 NOTE — REASON FOR VISIT
[Home] : at home, [unfilled] , at the time of the visit. [Medical Office: ([facility])___] : at the medical office located in  [Patient] : the patient [Follow - Up] : a follow-up visit [Other___] : [unfilled]

## 2024-10-03 ENCOUNTER — NON-APPOINTMENT (OUTPATIENT)
Age: 69
End: 2024-10-03

## 2024-10-03 ENCOUNTER — APPOINTMENT (OUTPATIENT)
Dept: DERMATOLOGY | Facility: CLINIC | Age: 69
End: 2024-10-03
Payer: MEDICARE

## 2024-10-03 DIAGNOSIS — L82.1 OTHER SEBORRHEIC KERATOSIS: ICD-10-CM

## 2024-10-03 DIAGNOSIS — L91.8 OTHER HYPERTROPHIC DISORDERS OF THE SKIN: ICD-10-CM

## 2024-10-03 DIAGNOSIS — L57.8 OTHER SKIN CHANGES DUE TO CHRONIC EXPOSURE TO NONIONIZING RADIATION: ICD-10-CM

## 2024-10-03 DIAGNOSIS — L21.9 SEBORRHEIC DERMATITIS, UNSPECIFIED: ICD-10-CM

## 2024-10-03 PROCEDURE — 99213 OFFICE O/P EST LOW 20 MIN: CPT

## 2024-10-03 NOTE — HISTORY OF PRESENT ILLNESS
[FreeTextEntry1] : New bump left perianal for few months. No treatment. [de-identified] : On dandruff shampoo and moisturizer scalp. "Much better."

## 2024-10-03 NOTE — ASSESSMENT
[FreeTextEntry1] :   Alert, oriented, well pleasant.   Sun-exposed cutaneous exam. No evidence of cutaneous malignancy. Brown, yellow papules and plaques generalized. Seborrheic keratoses. No treatment. Including scalp. Actinic damage. Reviewed sun protection.  Scalp, face, ears, without erythema or scale. h/o solar keratoses. Left perianal 9 o'clock, 0.2 cm filiform flesh colored papule. Fibroepithelial polyp. No treatment.   Follow up when returns from [place] in June.

## 2024-10-28 NOTE — HISTORY OF PRESENT ILLNESS
Will review hip x-ray with patient at office visit on 10/28/2024 [FreeTextEntry1] : Comes to the office for followup to review his medications discuss his overall health summarizes workup and aspirin opinion on his gastroesophageal dysphasia [de-identified] : 63-year-old man with a history headaches constipation erectile dysfunction osteoarthritis of his knees hemorrhoids GERD oral pharyngeal reflux and dysphagia patient has gone through several FEEST tests a barium swallow and endoscopy results only showing areas of reflux of the pharynx initial barium swallow questions or thickening of the distal esophagus however endoscopy did not show a tight GE junction patient's dysphasia or is not to liquids only rarely and occasionally to solids he has not had any aspiration pneumonia and is not lost any weight.He denies chest pain shortness of breath exertional dyspnea palpitations lightheadedness dizziness vertigo or syncope he denies abdominal pain nausea vomiting or diarrhea has a tendency towards constipation no melena Does urinate once or twice at night and has erectile dysfunction

## 2025-05-22 ENCOUNTER — APPOINTMENT (OUTPATIENT)
Dept: DERMATOLOGY | Facility: CLINIC | Age: 70
End: 2025-05-22
Payer: MEDICARE

## 2025-05-22 DIAGNOSIS — L57.8 OTHER SKIN CHANGES DUE TO CHRONIC EXPOSURE TO NONIONIZING RADIATION: ICD-10-CM

## 2025-05-22 DIAGNOSIS — Z12.11 ENCOUNTER FOR SCREENING FOR MALIGNANT NEOPLASM OF COLON: ICD-10-CM

## 2025-05-22 DIAGNOSIS — L30.8 OTHER SPECIFIED DERMATITIS: ICD-10-CM

## 2025-05-22 DIAGNOSIS — L82.1 OTHER SEBORRHEIC KERATOSIS: ICD-10-CM

## 2025-05-22 DIAGNOSIS — L57.0 ACTINIC KERATOSIS: ICD-10-CM

## 2025-05-22 PROCEDURE — 99213 OFFICE O/P EST LOW 20 MIN: CPT | Mod: 25

## 2025-05-22 PROCEDURE — 17003 DESTRUCT PREMALG LES 2-14: CPT

## 2025-05-22 PROCEDURE — 17000 DESTRUCT PREMALG LESION: CPT

## 2025-05-24 ENCOUNTER — NON-APPOINTMENT (OUTPATIENT)
Age: 70
End: 2025-05-24

## 2025-06-30 ENCOUNTER — NON-APPOINTMENT (OUTPATIENT)
Age: 70
End: 2025-06-30

## 2025-07-01 ENCOUNTER — APPOINTMENT (OUTPATIENT)
Dept: DERMATOLOGY | Facility: CLINIC | Age: 70
End: 2025-07-01
Payer: MEDICARE

## 2025-07-01 PROCEDURE — 17000 DESTRUCT PREMALG LESION: CPT

## 2025-07-01 PROCEDURE — 99212 OFFICE O/P EST SF 10 MIN: CPT | Mod: 25

## 2025-07-01 PROCEDURE — 17003 DESTRUCT PREMALG LES 2-14: CPT

## 2025-08-06 ENCOUNTER — APPOINTMENT (OUTPATIENT)
Dept: DERMATOLOGY | Facility: CLINIC | Age: 70
End: 2025-08-06
Payer: MEDICARE

## 2025-08-06 DIAGNOSIS — L21.9 SEBORRHEIC DERMATITIS, UNSPECIFIED: ICD-10-CM

## 2025-08-06 DIAGNOSIS — L57.8 OTHER SKIN CHANGES DUE TO CHRONIC EXPOSURE TO NONIONIZING RADIATION: ICD-10-CM

## 2025-08-06 DIAGNOSIS — L57.0 ACTINIC KERATOSIS: ICD-10-CM

## 2025-08-06 PROCEDURE — 99212 OFFICE O/P EST SF 10 MIN: CPT | Mod: 25

## 2025-08-06 PROCEDURE — 17004 DESTROY PREMAL LESIONS 15/>: CPT

## 2025-08-19 ENCOUNTER — APPOINTMENT (OUTPATIENT)
Dept: FAMILY MEDICINE | Facility: CLINIC | Age: 70
End: 2025-08-19
Payer: MEDICARE

## 2025-08-19 VITALS
HEIGHT: 73 IN | SYSTOLIC BLOOD PRESSURE: 122 MMHG | HEART RATE: 53 BPM | DIASTOLIC BLOOD PRESSURE: 68 MMHG | BODY MASS INDEX: 21.47 KG/M2 | WEIGHT: 162 LBS | RESPIRATION RATE: 16 BRPM | TEMPERATURE: 97.5 F | OXYGEN SATURATION: 99 %

## 2025-08-19 DIAGNOSIS — Z00.00 ENCOUNTER FOR GENERAL ADULT MEDICAL EXAMINATION W/OUT ABNORMAL FINDINGS: ICD-10-CM

## 2025-08-19 DIAGNOSIS — Z13.29 ENCOUNTER FOR SCREENING FOR OTHER SUSPECTED ENDOCRINE DISORDER: ICD-10-CM

## 2025-08-19 PROCEDURE — G0439: CPT

## 2025-08-21 ENCOUNTER — APPOINTMENT (OUTPATIENT)
Dept: DERMATOLOGY | Facility: CLINIC | Age: 70
End: 2025-08-21

## 2025-08-22 ENCOUNTER — LABORATORY RESULT (OUTPATIENT)
Age: 70
End: 2025-08-22

## 2025-08-25 LAB
25(OH)D3 SERPL-MCNC: 68 NG/ML
ALBUMIN SERPL ELPH-MCNC: 4.5 G/DL
ALP BLD-CCNC: 66 U/L
ALT SERPL-CCNC: 24 U/L
ANA TITR SER: NEGATIVE
ANION GAP SERPL CALC-SCNC: 12 MMOL/L
APPEARANCE: ABNORMAL
AST SERPL-CCNC: 22 U/L
BACTERIA: NEGATIVE /HPF
BASOPHILS # BLD AUTO: 0.03 K/UL
BASOPHILS NFR BLD AUTO: 0.7 %
BILIRUB SERPL-MCNC: 0.6 MG/DL
BILIRUBIN URINE: NEGATIVE
BLOOD URINE: NEGATIVE
BUN SERPL-MCNC: 10 MG/DL
CALCIUM SERPL-MCNC: 10 MG/DL
CAST: 0 /LPF
CCP AB SER IA-ACNC: <8 U/ML
CCP AB SER QL: NEGATIVE
CHLORIDE SERPL-SCNC: 104 MMOL/L
CHOLEST SERPL-MCNC: 204 MG/DL
CO2 SERPL-SCNC: 27 MMOL/L
COLOR: YELLOW
CREAT SERPL-MCNC: 0.88 MG/DL
CRP SERPL-MCNC: <3 MG/L
DSDNA AB SER-ACNC: 1 IU/ML
EGFRCR SERPLBLD CKD-EPI 2021: 93 ML/MIN/1.73M2
EOSINOPHIL # BLD AUTO: 0.17 K/UL
EOSINOPHIL NFR BLD AUTO: 4 %
EPITHELIAL CELLS: 0 /HPF
ERYTHROCYTE [SEDIMENTATION RATE] IN BLOOD BY WESTERGREN METHOD: 2 MM/HR
ESTIMATED AVERAGE GLUCOSE: 114 MG/DL
GLUCOSE QUALITATIVE U: NEGATIVE MG/DL
GLUCOSE SERPL-MCNC: 94 MG/DL
HBA1C MFR BLD HPLC: 5.6 %
HCT VFR BLD CALC: 42.9 %
HDLC SERPL-MCNC: 62 MG/DL
HGB BLD-MCNC: 14.4 G/DL
IMM GRANULOCYTES NFR BLD AUTO: 0.2 %
KETONES URINE: NEGATIVE MG/DL
LDLC SERPL-MCNC: 129 MG/DL
LEUKOCYTE ESTERASE URINE: NEGATIVE
LYMPHOCYTES # BLD AUTO: 1.3 K/UL
LYMPHOCYTES NFR BLD AUTO: 31 %
MAN DIFF?: NORMAL
MCHC RBC-ENTMCNC: 31.6 PG
MCHC RBC-ENTMCNC: 33.6 G/DL
MCV RBC AUTO: 94.1 FL
MICROSCOPIC-UA: NORMAL
MONOCYTES # BLD AUTO: 0.56 K/UL
MONOCYTES NFR BLD AUTO: 13.3 %
NEUTROPHILS # BLD AUTO: 2.13 K/UL
NEUTROPHILS NFR BLD AUTO: 50.8 %
NITRITE URINE: NEGATIVE
NONHDLC SERPL-MCNC: 142 MG/DL
PH URINE: 7.5
PLATELET # BLD AUTO: 238 K/UL
POTASSIUM SERPL-SCNC: 4.2 MMOL/L
PROT SERPL-MCNC: 6.7 G/DL
PROTEIN URINE: NEGATIVE MG/DL
PSA FREE FLD-MCNC: 38 %
PSA FREE SERPL-MCNC: 0.56 NG/ML
PSA SERPL-MCNC: 1.49 NG/ML
RBC # BLD: 4.56 M/UL
RBC # FLD: 12.6 %
RED BLOOD CELLS URINE: 1 /HPF
RHEUMATOID FACT SERPL-ACNC: <10 IU/ML
SODIUM SERPL-SCNC: 142 MMOL/L
SPECIFIC GRAVITY URINE: 1.01
TRIGL SERPL-MCNC: 73 MG/DL
TSH SERPL-ACNC: 2.82 UIU/ML
UROBILINOGEN URINE: 0.2 MG/DL
WBC # FLD AUTO: 4.2 K/UL
WHITE BLOOD CELLS URINE: 0 /HPF

## 2025-08-26 RX ORDER — HYDROCORTISONE 25 MG/G
2.5 CREAM TOPICAL
Qty: 1 | Refills: 0 | Status: ACTIVE | COMMUNITY
Start: 2025-08-26 | End: 1900-01-01

## 2025-08-28 ENCOUNTER — APPOINTMENT (OUTPATIENT)
Dept: CARDIOLOGY | Facility: CLINIC | Age: 70
End: 2025-08-28
Payer: MEDICARE

## 2025-08-28 ENCOUNTER — NON-APPOINTMENT (OUTPATIENT)
Age: 70
End: 2025-08-28

## 2025-08-28 VITALS — DIASTOLIC BLOOD PRESSURE: 82 MMHG | HEART RATE: 72 BPM | SYSTOLIC BLOOD PRESSURE: 110 MMHG

## 2025-08-28 VITALS
DIASTOLIC BLOOD PRESSURE: 50 MMHG | OXYGEN SATURATION: 99 % | WEIGHT: 162 LBS | RESPIRATION RATE: 18 BRPM | SYSTOLIC BLOOD PRESSURE: 115 MMHG | HEIGHT: 73 IN | HEART RATE: 66 BPM | BODY MASS INDEX: 21.47 KG/M2

## 2025-08-28 DIAGNOSIS — E78.5 HYPERLIPIDEMIA, UNSPECIFIED: ICD-10-CM

## 2025-08-28 PROCEDURE — G2211 COMPLEX E/M VISIT ADD ON: CPT

## 2025-08-28 PROCEDURE — 93000 ELECTROCARDIOGRAM COMPLETE: CPT

## 2025-08-28 PROCEDURE — 99215 OFFICE O/P EST HI 40 MIN: CPT

## 2025-09-09 ENCOUNTER — APPOINTMENT (OUTPATIENT)
Dept: DERMATOLOGY | Facility: CLINIC | Age: 70
End: 2025-09-09
Payer: MEDICARE

## 2025-09-09 DIAGNOSIS — L57.8 OTHER SKIN CHANGES DUE TO CHRONIC EXPOSURE TO NONIONIZING RADIATION: ICD-10-CM

## 2025-09-09 DIAGNOSIS — L21.9 SEBORRHEIC DERMATITIS, UNSPECIFIED: ICD-10-CM

## 2025-09-09 DIAGNOSIS — H01.00B UNSPECIFIED BLEPHARITIS RIGHT EYE, UPPER AND LOWER EYELIDS: ICD-10-CM

## 2025-09-09 DIAGNOSIS — H01.00A UNSPECIFIED BLEPHARITIS RIGHT EYE, UPPER AND LOWER EYELIDS: ICD-10-CM

## 2025-09-09 DIAGNOSIS — L30.8 OTHER SPECIFIED DERMATITIS: ICD-10-CM

## 2025-09-09 PROCEDURE — 99213 OFFICE O/P EST LOW 20 MIN: CPT

## 2025-09-11 ENCOUNTER — APPOINTMENT (OUTPATIENT)
Dept: SURGERY | Facility: CLINIC | Age: 70
End: 2025-09-11
Payer: MEDICARE

## 2025-09-11 VITALS
OXYGEN SATURATION: 98 % | RESPIRATION RATE: 16 BRPM | BODY MASS INDEX: 21.47 KG/M2 | HEART RATE: 69 BPM | WEIGHT: 162 LBS | HEIGHT: 73 IN | TEMPERATURE: 97 F | DIASTOLIC BLOOD PRESSURE: 68 MMHG | SYSTOLIC BLOOD PRESSURE: 110 MMHG

## 2025-09-11 DIAGNOSIS — R10.31 RIGHT LOWER QUADRANT PAIN: ICD-10-CM

## 2025-09-11 PROCEDURE — 99203 OFFICE O/P NEW LOW 30 MIN: CPT

## 2025-09-15 ENCOUNTER — APPOINTMENT (OUTPATIENT)
Dept: ULTRASOUND IMAGING | Facility: CLINIC | Age: 70
End: 2025-09-15